# Patient Record
Sex: MALE | Race: WHITE
[De-identification: names, ages, dates, MRNs, and addresses within clinical notes are randomized per-mention and may not be internally consistent; named-entity substitution may affect disease eponyms.]

---

## 2019-06-24 ENCOUNTER — HOSPITAL ENCOUNTER (OUTPATIENT)
Dept: HOSPITAL 95 - ER | Age: 74
Setting detail: OBSERVATION
LOS: 2 days | Discharge: HOME | End: 2019-06-26
Attending: HOSPITALIST | Admitting: HOSPITALIST
Payer: MEDICARE

## 2019-06-24 VITALS — BODY MASS INDEX: 31.84 KG/M2 | HEIGHT: 69.02 IN | WEIGHT: 214.95 LBS

## 2019-06-24 DIAGNOSIS — J44.1: ICD-10-CM

## 2019-06-24 DIAGNOSIS — C79.51: ICD-10-CM

## 2019-06-24 DIAGNOSIS — I10: ICD-10-CM

## 2019-06-24 DIAGNOSIS — Z88.8: ICD-10-CM

## 2019-06-24 DIAGNOSIS — D72.829: ICD-10-CM

## 2019-06-24 DIAGNOSIS — K21.9: ICD-10-CM

## 2019-06-24 DIAGNOSIS — Z87.891: ICD-10-CM

## 2019-06-24 DIAGNOSIS — R55: Primary | ICD-10-CM

## 2019-06-24 DIAGNOSIS — Z79.899: ICD-10-CM

## 2019-06-24 DIAGNOSIS — C61: ICD-10-CM

## 2019-06-24 PROCEDURE — G0378 HOSPITAL OBSERVATION PER HR: HCPCS

## 2019-06-25 LAB
ALBUMIN SERPL BCP-MCNC: 3.1 G/DL (ref 3.4–5)
ALBUMIN/GLOB SERPL: 1 {RATIO} (ref 0.8–1.8)
ALT SERPL W P-5'-P-CCNC: 23 U/L (ref 12–78)
ANION GAP SERPL CALCULATED.4IONS-SCNC: 4 MMOL/L (ref 6–16)
AST SERPL W P-5'-P-CCNC: 15 U/L (ref 12–37)
BILIRUB SERPL-MCNC: 0.4 MG/DL (ref 0.1–1)
BUN SERPL-MCNC: 21 MG/DL (ref 8–24)
CALCIUM SERPL-MCNC: 8.8 MG/DL (ref 8.5–10.1)
CHLORIDE SERPL-SCNC: 106 MMOL/L (ref 98–108)
CO2 SERPL-SCNC: 32 MMOL/L (ref 21–32)
CREAT SERPL-MCNC: 0.88 MG/DL (ref 0.6–1.2)
DEPRECATED RDW RBC AUTO: 48.4 FL (ref 35.1–46.3)
ERYTHROCYTE [DISTWIDTH] IN BLOOD BY AUTOMATED COUNT: 14.6 % (ref 11.7–14.2)
GLOBULIN SER CALC-MCNC: 3 G/DL (ref 2.2–4)
GLUCOSE SERPL-MCNC: 103 MG/DL (ref 70–99)
HCT VFR BLD AUTO: 41.1 % (ref 37–53)
HGB BLD-MCNC: 12.9 G/DL (ref 13.5–17.5)
MCHC RBC AUTO-ENTMCNC: 31.4 G/DL (ref 31.5–36.5)
MCV RBC AUTO: 91 FL (ref 80–100)
NRBC # BLD AUTO: 0 K/MM3 (ref 0–0.02)
NRBC BLD AUTO-RTO: 0 /100 WBC (ref 0–0.2)
PLATELET # BLD AUTO: 288 K/MM3 (ref 150–400)
POTASSIUM SERPL-SCNC: 3.5 MMOL/L (ref 3.5–5.5)
PROT SERPL-MCNC: 6.1 G/DL (ref 6.4–8.2)
SODIUM SERPL-SCNC: 142 MMOL/L (ref 136–145)

## 2019-06-25 NOTE — NUR
SHIFT SUMMARY-
PT DENIES PAIN. DENIES DIZZINESS. DENIES SOB. RESP E/U ON RA. DENIES N/V. ECHO
DONE TODAY. PT WENT FOR CTA OF CHEST THIS PM. NSR AT 70 WITH PVC'S PER PCU
MONITOR TECH. SBA TO THE BATHROOM. PT'S WIFE IN TO VISIT THIS PM. NO OTHER
SIGNIFICANT CHANGES THIS SHIFT.

## 2019-06-25 NOTE — NUR
SHIFT SUMMARY
PT ADMITTED FOR SYNCOPE. FULL CODE. REGULAR DIET. TELE-NSR-AT A RATE OF 84 PER
TELE TECH. PT TO HAVE AN ECHO IN THE MORNING. LOVENOX FOR DVT PROPHYLAXIS. NS
AT A RATE  MLS/HR X1 BAG. 22G IV TO L HAND. SBA WITH TRANSFERS. TAKES
MEDICATIONS WHOLE. THE PT PRESENTED WITH SYNCOPY. THE PT REPORTED HAVING
SEVERAL SYNCOPAL EPISODES OVER THE PAST FEW DAYS WHILE CAUGHING THAT THE PTS
WIFE REPORTS TO HAVE OBSERVED. THE PT PRESENTED FROM [facility] PER
REPORT. THE PT WAS ADMITTED UNDER OBSERVATIONS FOR SYNCOPAL EPISODES OF
UNCLEAR ETIOLOGY, POSSIBLY VASOVAGAL. THE PT TO HAVE ORTHOSTATIC VS COMPLETED.
THE PT IS ALERT, ORIENTED, PLEASENT, AND COOPERATIVE. THE PT IS AWAKE AT THIS
TIME AND CNA ABOUT TO ENTER ROOM. NO APPARENT SIGNS OF ACUTE DISTRESS. ABLE TO
MAKE NEEDS KNOWN AND CALL LIGHT IN REACH.

## 2019-06-26 LAB
ALBUMIN SERPL BCP-MCNC: 3.2 G/DL (ref 3.4–5)
ALBUMIN/GLOB SERPL: 1 {RATIO} (ref 0.8–1.8)
ALT SERPL W P-5'-P-CCNC: 22 U/L (ref 12–78)
ANION GAP SERPL CALCULATED.4IONS-SCNC: 5 MMOL/L (ref 6–16)
AST SERPL W P-5'-P-CCNC: 8 U/L (ref 12–37)
BASOPHILS # BLD AUTO: 0.11 K/MM3 (ref 0–0.23)
BASOPHILS NFR BLD AUTO: 1 % (ref 0–2)
BILIRUB SERPL-MCNC: 0.4 MG/DL (ref 0.1–1)
BUN SERPL-MCNC: 20 MG/DL (ref 8–24)
CALCIUM SERPL-MCNC: 9.2 MG/DL (ref 8.5–10.1)
CHLORIDE SERPL-SCNC: 105 MMOL/L (ref 98–108)
CO2 SERPL-SCNC: 31 MMOL/L (ref 21–32)
CREAT SERPL-MCNC: 1 MG/DL (ref 0.6–1.2)
DEPRECATED RDW RBC AUTO: 48.8 FL (ref 35.1–46.3)
EOSINOPHIL # BLD AUTO: 0.42 K/MM3 (ref 0–0.68)
EOSINOPHIL NFR BLD AUTO: 3 % (ref 0–6)
ERYTHROCYTE [DISTWIDTH] IN BLOOD BY AUTOMATED COUNT: 14.6 % (ref 11.7–14.2)
GLOBULIN SER CALC-MCNC: 3.3 G/DL (ref 2.2–4)
GLUCOSE SERPL-MCNC: 139 MG/DL (ref 70–99)
HCT VFR BLD AUTO: 43.1 % (ref 37–53)
HGB BLD-MCNC: 13.6 G/DL (ref 13.5–17.5)
IMM GRANULOCYTES # BLD AUTO: 0.27 K/MM3 (ref 0–0.1)
IMM GRANULOCYTES NFR BLD AUTO: 2 % (ref 0–1)
LYMPHOCYTES # BLD AUTO: 3.88 K/MM3 (ref 0.84–5.2)
LYMPHOCYTES NFR BLD AUTO: 23 % (ref 21–46)
MCHC RBC AUTO-ENTMCNC: 31.6 G/DL (ref 31.5–36.5)
MCV RBC AUTO: 91 FL (ref 80–100)
MONOCYTES # BLD AUTO: 1.21 K/MM3 (ref 0.16–1.47)
MONOCYTES NFR BLD AUTO: 7 % (ref 4–13)
NEUTROPHILS # BLD AUTO: 10.78 K/MM3 (ref 1.96–9.15)
NEUTROPHILS NFR BLD AUTO: 65 % (ref 41–73)
NRBC # BLD AUTO: 0 K/MM3 (ref 0–0.02)
NRBC BLD AUTO-RTO: 0 /100 WBC (ref 0–0.2)
PLATELET # BLD AUTO: 282 K/MM3 (ref 150–400)
POTASSIUM SERPL-SCNC: 3.9 MMOL/L (ref 3.5–5.5)
PROT SERPL-MCNC: 6.5 G/DL (ref 6.4–8.2)
SODIUM SERPL-SCNC: 141 MMOL/L (ref 136–145)
TSH SERPL DL<=0.005 MIU/L-ACNC: 0.29 UIU/ML (ref 0.36–4.8)

## 2019-06-26 NOTE — NUR
NOC SHIFT SUMMARY
PT ADMITTED FOR SYNCOPE AT REST. TELE IS ON AND WAS NSR RATE 70 WITH PVC'S ON
LAST CHECK. VSS. HE IS AAOX4. RESP EVEN AND UNLABORED. USES CALL LIGHT
APPROPRIATELY. HE HAS SLEPT ON AND OFF THIS NIGHT. NO COMPLAINTS OF PAIN OR
DISCOMFORT. WIFE WAS HERE EARLIER BUT LEFT IN THE EVENING. NO SYNCOPAL
EPISODES SEEN THIS SHIFT. PT PRESENTLY IN BED AND APPEARS IN NO ACUTE
DISTRESS. WILL CONTINUE TO MONITOR.

## 2019-06-26 NOTE — NUR
D/C INSTRUCTIONS PROVIDED AND EXPLAINED TO PT. IV AND TELE REMOVED. PT D/C VIA
WHEELCHAIR WITH SPOUSE AND CNA AT 1600.

## 2019-07-30 ENCOUNTER — HOSPITAL ENCOUNTER (OUTPATIENT)
Dept: HOSPITAL 95 - ORSCSDS | Age: 74
Discharge: HOME | End: 2019-07-30
Attending: INTERNAL MEDICINE
Payer: MEDICARE

## 2019-07-30 VITALS — WEIGHT: 199.08 LBS | BODY MASS INDEX: 29.49 KG/M2 | HEIGHT: 69.02 IN

## 2019-07-30 DIAGNOSIS — E78.5: ICD-10-CM

## 2019-07-30 DIAGNOSIS — Z87.891: ICD-10-CM

## 2019-07-30 DIAGNOSIS — Z79.899: ICD-10-CM

## 2019-07-30 DIAGNOSIS — D12.5: ICD-10-CM

## 2019-07-30 DIAGNOSIS — K22.70: Primary | ICD-10-CM

## 2019-07-30 DIAGNOSIS — D12.2: ICD-10-CM

## 2019-07-30 DIAGNOSIS — I10: ICD-10-CM

## 2019-07-30 DIAGNOSIS — R19.5: ICD-10-CM

## 2019-07-30 DIAGNOSIS — J44.9: ICD-10-CM

## 2019-07-30 DIAGNOSIS — K29.70: ICD-10-CM

## 2019-07-30 PROCEDURE — 0DB58ZX EXCISION OF ESOPHAGUS, VIA NATURAL OR ARTIFICIAL OPENING ENDOSCOPIC, DIAGNOSTIC: ICD-10-PCS | Performed by: INTERNAL MEDICINE

## 2019-07-30 PROCEDURE — 0DB68ZX EXCISION OF STOMACH, VIA NATURAL OR ARTIFICIAL OPENING ENDOSCOPIC, DIAGNOSTIC: ICD-10-PCS | Performed by: INTERNAL MEDICINE

## 2019-07-30 PROCEDURE — 0DBK8ZX EXCISION OF ASCENDING COLON, VIA NATURAL OR ARTIFICIAL OPENING ENDOSCOPIC, DIAGNOSTIC: ICD-10-PCS | Performed by: INTERNAL MEDICINE

## 2019-07-30 PROCEDURE — 0DBN8ZX EXCISION OF SIGMOID COLON, VIA NATURAL OR ARTIFICIAL OPENING ENDOSCOPIC, DIAGNOSTIC: ICD-10-PCS | Performed by: INTERNAL MEDICINE

## 2019-07-30 NOTE — NUR
07/30/19 1525 Reta Skaggs
PT. DENIES ANY PAIN OR SORE THROAT. WHEN TAKING BITE BLOCK OUT OF
MOUTH IN ENDO ROOM, OBSERVED BLEEDING UNDERNEATH TONGUE. PT. DENIES
ANY PAIN OR SORENESS. INSTRUCTED PT. THAT HE MIGHT HAVE A LITTLE CUT
UNDERNEATH HIS TONGUE FROM THE BITE BLOCK IF HE FEELS ANY SORENESS
UNDERNEATH TONGUE.

## 2019-08-02 ENCOUNTER — HOSPITAL ENCOUNTER (OUTPATIENT)
Dept: HOSPITAL 95 - LAB SHORT | Age: 74
Discharge: HOME | End: 2019-08-02
Attending: INTERNAL MEDICINE
Payer: MEDICARE

## 2019-08-02 DIAGNOSIS — D35.02: Primary | ICD-10-CM

## 2019-08-22 ENCOUNTER — HOSPITAL ENCOUNTER (EMERGENCY)
Dept: HOSPITAL 95 - ER | Age: 74
Discharge: HOME | End: 2019-08-22
Payer: MEDICARE

## 2019-08-22 VITALS — WEIGHT: 203 LBS | HEIGHT: 69 IN | BODY MASS INDEX: 30.07 KG/M2

## 2019-08-22 DIAGNOSIS — I10: ICD-10-CM

## 2019-08-22 DIAGNOSIS — J20.9: Primary | ICD-10-CM

## 2019-08-22 DIAGNOSIS — K21.9: ICD-10-CM

## 2019-08-22 DIAGNOSIS — J44.0: ICD-10-CM

## 2019-08-22 DIAGNOSIS — Z79.899: ICD-10-CM

## 2019-08-22 DIAGNOSIS — Z88.8: ICD-10-CM

## 2019-08-22 DIAGNOSIS — Z85.46: ICD-10-CM

## 2019-08-22 LAB
ALBUMIN SERPL BCP-MCNC: 3.8 G/DL (ref 3.4–5)
ALBUMIN/GLOB SERPL: 1.1 {RATIO} (ref 0.8–1.8)
ALT SERPL W P-5'-P-CCNC: 26 U/L (ref 12–78)
ANION GAP SERPL CALCULATED.4IONS-SCNC: 5 MMOL/L (ref 6–16)
AST SERPL W P-5'-P-CCNC: 19 U/L (ref 12–37)
BASOPHILS # BLD AUTO: 0.11 K/MM3 (ref 0–0.23)
BASOPHILS NFR BLD AUTO: 1 % (ref 0–2)
BILIRUB SERPL-MCNC: 0.2 MG/DL (ref 0.1–1)
BUN SERPL-MCNC: 19 MG/DL (ref 8–24)
CALCIUM SERPL-MCNC: 9.1 MG/DL (ref 8.5–10.1)
CHLORIDE SERPL-SCNC: 106 MMOL/L (ref 98–108)
CO2 SERPL-SCNC: 32 MMOL/L (ref 21–32)
CREAT SERPL-MCNC: 1.11 MG/DL (ref 0.6–1.2)
DEPRECATED RDW RBC AUTO: 47.7 FL (ref 35.1–46.3)
EOSINOPHIL # BLD AUTO: 0.71 K/MM3 (ref 0–0.68)
EOSINOPHIL NFR BLD AUTO: 7 % (ref 0–6)
ERYTHROCYTE [DISTWIDTH] IN BLOOD BY AUTOMATED COUNT: 14.3 % (ref 11.7–14.2)
GLOBULIN SER CALC-MCNC: 3.6 G/DL (ref 2.2–4)
GLUCOSE SERPL-MCNC: 128 MG/DL (ref 70–99)
HCT VFR BLD AUTO: 45.9 % (ref 37–53)
HGB BLD-MCNC: 14.5 G/DL (ref 13.5–17.5)
IMM GRANULOCYTES # BLD AUTO: 0.11 K/MM3 (ref 0–0.1)
IMM GRANULOCYTES NFR BLD AUTO: 1 % (ref 0–1)
LYMPHOCYTES # BLD AUTO: 2.53 K/MM3 (ref 0.84–5.2)
LYMPHOCYTES NFR BLD AUTO: 24 % (ref 21–46)
MCHC RBC AUTO-ENTMCNC: 31.6 G/DL (ref 31.5–36.5)
MCV RBC AUTO: 90 FL (ref 80–100)
MONOCYTES # BLD AUTO: 1.21 K/MM3 (ref 0.16–1.47)
MONOCYTES NFR BLD AUTO: 11 % (ref 4–13)
NEUTROPHILS # BLD AUTO: 5.94 K/MM3 (ref 1.96–9.15)
NEUTROPHILS NFR BLD AUTO: 56 % (ref 41–73)
NRBC # BLD AUTO: 0 K/MM3 (ref 0–0.02)
NRBC BLD AUTO-RTO: 0 /100 WBC (ref 0–0.2)
PLATELET # BLD AUTO: 295 K/MM3 (ref 150–400)
POTASSIUM SERPL-SCNC: 3.9 MMOL/L (ref 3.5–5.5)
PROT SERPL-MCNC: 7.4 G/DL (ref 6.4–8.2)
SODIUM SERPL-SCNC: 143 MMOL/L (ref 136–145)
TROPONIN I SERPL-MCNC: 0.02 NG/ML (ref 0–0.04)

## 2020-03-08 ENCOUNTER — HOSPITAL ENCOUNTER (EMERGENCY)
Dept: HOSPITAL 95 - ER | Age: 75
Discharge: HOME | End: 2020-03-08
Payer: MEDICARE

## 2020-03-08 VITALS — BODY MASS INDEX: 30.51 KG/M2 | HEIGHT: 69 IN | WEIGHT: 206 LBS

## 2020-03-08 DIAGNOSIS — Z79.899: ICD-10-CM

## 2020-03-08 DIAGNOSIS — Z79.51: ICD-10-CM

## 2020-03-08 DIAGNOSIS — Z87.891: ICD-10-CM

## 2020-03-08 DIAGNOSIS — J44.9: ICD-10-CM

## 2020-03-08 DIAGNOSIS — C79.51: ICD-10-CM

## 2020-03-08 DIAGNOSIS — Z88.8: ICD-10-CM

## 2020-03-08 DIAGNOSIS — I10: ICD-10-CM

## 2020-03-08 DIAGNOSIS — C61: Primary | ICD-10-CM

## 2020-03-08 DIAGNOSIS — K21.9: ICD-10-CM

## 2020-05-03 ENCOUNTER — HOSPITAL ENCOUNTER (EMERGENCY)
Dept: HOSPITAL 95 - ER | Age: 75
LOS: 1 days | Discharge: HOME | End: 2020-05-04
Payer: MEDICARE

## 2020-05-03 VITALS — WEIGHT: 180.01 LBS | HEIGHT: 68 IN | BODY MASS INDEX: 27.28 KG/M2

## 2020-05-03 DIAGNOSIS — Z87.891: ICD-10-CM

## 2020-05-03 DIAGNOSIS — Z79.51: ICD-10-CM

## 2020-05-03 DIAGNOSIS — Z79.899: ICD-10-CM

## 2020-05-03 DIAGNOSIS — J44.9: ICD-10-CM

## 2020-05-03 DIAGNOSIS — R53.83: Primary | ICD-10-CM

## 2020-05-03 DIAGNOSIS — I10: ICD-10-CM

## 2020-05-03 LAB
ALBUMIN SERPL BCP-MCNC: 3.2 G/DL (ref 3.4–5)
ALBUMIN/GLOB SERPL: 0.8 {RATIO} (ref 0.8–1.8)
ALT SERPL W P-5'-P-CCNC: 25 U/L (ref 12–78)
ANION GAP SERPL CALCULATED.4IONS-SCNC: 5 MMOL/L (ref 6–16)
AST SERPL W P-5'-P-CCNC: 21 U/L (ref 12–37)
BASOPHILS # BLD AUTO: 0.08 K/MM3 (ref 0–0.23)
BASOPHILS NFR BLD AUTO: 0 % (ref 0–2)
BILIRUB SERPL-MCNC: 0.2 MG/DL (ref 0.1–1)
BUN SERPL-MCNC: 41 MG/DL (ref 8–24)
CALCIUM SERPL-MCNC: 9.2 MG/DL (ref 8.5–10.1)
CHLORIDE SERPL-SCNC: 107 MMOL/L (ref 98–108)
CO2 SERPL-SCNC: 30 MMOL/L (ref 21–32)
CREAT SERPL-MCNC: 0.76 MG/DL (ref 0.6–1.2)
DEPRECATED RDW RBC AUTO: 53.4 FL (ref 35.1–46.3)
EOSINOPHIL # BLD AUTO: 0.13 K/MM3 (ref 0–0.68)
EOSINOPHIL NFR BLD AUTO: 1 % (ref 0–6)
ERYTHROCYTE [DISTWIDTH] IN BLOOD BY AUTOMATED COUNT: 16.6 % (ref 11.7–14.2)
GLOBULIN SER CALC-MCNC: 3.8 G/DL (ref 2.2–4)
GLUCOSE SERPL-MCNC: 150 MG/DL (ref 70–99)
HCT VFR BLD AUTO: 45 % (ref 37–53)
HGB BLD-MCNC: 14.1 G/DL (ref 13.5–17.5)
IMM GRANULOCYTES # BLD AUTO: 0.78 K/MM3 (ref 0–0.1)
IMM GRANULOCYTES NFR BLD AUTO: 3 % (ref 0–1)
LYMPHOCYTES # BLD AUTO: 2.91 K/MM3 (ref 0.84–5.2)
LYMPHOCYTES NFR BLD AUTO: 11 % (ref 21–46)
MCHC RBC AUTO-ENTMCNC: 31.3 G/DL (ref 31.5–36.5)
MCV RBC AUTO: 87 FL (ref 80–100)
MONOCYTES # BLD AUTO: 2.42 K/MM3 (ref 0.16–1.47)
MONOCYTES NFR BLD AUTO: 9 % (ref 4–13)
NEUTROPHILS # BLD AUTO: 19.39 K/MM3 (ref 1.96–9.15)
NEUTROPHILS NFR BLD AUTO: 76 % (ref 41–73)
NRBC # BLD AUTO: 0 K/MM3 (ref 0–0.02)
NRBC BLD AUTO-RTO: 0 /100 WBC (ref 0–0.2)
PLATELET # BLD AUTO: 296 K/MM3 (ref 150–400)
POTASSIUM SERPL-SCNC: 3.9 MMOL/L (ref 3.5–5.5)
PROT SERPL-MCNC: 7 G/DL (ref 6.4–8.2)
SODIUM SERPL-SCNC: 142 MMOL/L (ref 136–145)
TROPONIN I SERPL-MCNC: 0.02 NG/ML (ref 0–0.04)

## 2020-05-14 ENCOUNTER — HOSPITAL ENCOUNTER (EMERGENCY)
Dept: HOSPITAL 95 - ER | Age: 75
Discharge: HOME | End: 2020-05-14
Payer: MEDICARE

## 2020-05-14 VITALS — WEIGHT: 206 LBS | HEIGHT: 69 IN | BODY MASS INDEX: 30.51 KG/M2

## 2020-05-14 DIAGNOSIS — Z88.8: ICD-10-CM

## 2020-05-14 DIAGNOSIS — K21.9: ICD-10-CM

## 2020-05-14 DIAGNOSIS — E86.0: Primary | ICD-10-CM

## 2020-05-14 DIAGNOSIS — I10: ICD-10-CM

## 2020-05-14 DIAGNOSIS — Z79.899: ICD-10-CM

## 2020-05-14 DIAGNOSIS — Z85.46: ICD-10-CM

## 2020-05-14 DIAGNOSIS — J44.9: ICD-10-CM

## 2020-05-14 LAB
ALBUMIN SERPL BCP-MCNC: 2.9 G/DL (ref 3.4–5)
ALBUMIN/GLOB SERPL: 0.9 {RATIO} (ref 0.8–1.8)
ALT SERPL W P-5'-P-CCNC: 25 U/L (ref 12–78)
ANION GAP SERPL CALCULATED.4IONS-SCNC: 3 MMOL/L (ref 6–16)
AST SERPL W P-5'-P-CCNC: 13 U/L (ref 12–37)
BASOPHILS # BLD AUTO: 0.08 K/MM3 (ref 0–0.23)
BASOPHILS NFR BLD AUTO: 0 % (ref 0–2)
BILIRUB SERPL-MCNC: 0.6 MG/DL (ref 0.1–1)
BUN SERPL-MCNC: 32 MG/DL (ref 8–24)
CALCIUM SERPL-MCNC: 8.7 MG/DL (ref 8.5–10.1)
CHLORIDE SERPL-SCNC: 106 MMOL/L (ref 98–108)
CO2 SERPL-SCNC: 32 MMOL/L (ref 21–32)
CREAT SERPL-MCNC: 0.85 MG/DL (ref 0.6–1.2)
DEPRECATED RDW RBC AUTO: 55.8 FL (ref 35.1–46.3)
EOSINOPHIL # BLD AUTO: 0.18 K/MM3 (ref 0–0.68)
EOSINOPHIL NFR BLD AUTO: 1 % (ref 0–6)
ERYTHROCYTE [DISTWIDTH] IN BLOOD BY AUTOMATED COUNT: 17.5 % (ref 11.7–14.2)
GLOBULIN SER CALC-MCNC: 3.2 G/DL (ref 2.2–4)
GLUCOSE SERPL-MCNC: 127 MG/DL (ref 70–99)
HCT VFR BLD AUTO: 44.2 % (ref 37–53)
HGB BLD-MCNC: 13.9 G/DL (ref 13.5–17.5)
IMM GRANULOCYTES # BLD AUTO: 0.52 K/MM3 (ref 0–0.1)
IMM GRANULOCYTES NFR BLD AUTO: 2 % (ref 0–1)
LYMPHOCYTES # BLD AUTO: 1.85 K/MM3 (ref 0.84–5.2)
LYMPHOCYTES NFR BLD AUTO: 8 % (ref 21–46)
MAGNESIUM SERPL-MCNC: 2.4 MG/DL (ref 1.6–2.4)
MCHC RBC AUTO-ENTMCNC: 31.4 G/DL (ref 31.5–36.5)
MCV RBC AUTO: 88 FL (ref 80–100)
MONOCYTES # BLD AUTO: 1.69 K/MM3 (ref 0.16–1.47)
MONOCYTES NFR BLD AUTO: 7 % (ref 4–13)
NEUTROPHILS # BLD AUTO: 18.92 K/MM3 (ref 1.96–9.15)
NEUTROPHILS NFR BLD AUTO: 81 % (ref 41–73)
NRBC # BLD AUTO: 0 K/MM3 (ref 0–0.02)
NRBC BLD AUTO-RTO: 0 /100 WBC (ref 0–0.2)
PLATELET # BLD AUTO: 248 K/MM3 (ref 150–400)
POTASSIUM SERPL-SCNC: 4.3 MMOL/L (ref 3.5–5.5)
PROT SERPL-MCNC: 6.1 G/DL (ref 6.4–8.2)
SODIUM SERPL-SCNC: 141 MMOL/L (ref 136–145)
SP GR SPEC: 1.01 (ref 1–1.02)
UROBILINOGEN UR STRIP-MCNC: (no result) MG/DL

## 2020-05-17 ENCOUNTER — HOSPITAL ENCOUNTER (EMERGENCY)
Dept: HOSPITAL 95 - ER | Age: 75
Discharge: HOME | End: 2020-05-17
Payer: MEDICARE

## 2020-05-17 VITALS — BODY MASS INDEX: 29.62 KG/M2 | WEIGHT: 200 LBS | HEIGHT: 69 IN

## 2020-05-17 DIAGNOSIS — K21.9: ICD-10-CM

## 2020-05-17 DIAGNOSIS — J44.9: ICD-10-CM

## 2020-05-17 DIAGNOSIS — Z79.899: ICD-10-CM

## 2020-05-17 DIAGNOSIS — C25.9: ICD-10-CM

## 2020-05-17 DIAGNOSIS — Z79.51: ICD-10-CM

## 2020-05-17 DIAGNOSIS — C79.82: ICD-10-CM

## 2020-05-17 DIAGNOSIS — R11.2: Primary | ICD-10-CM

## 2020-05-17 DIAGNOSIS — Z88.8: ICD-10-CM

## 2020-05-17 DIAGNOSIS — I10: ICD-10-CM

## 2020-05-17 LAB
ALBUMIN SERPL BCP-MCNC: 2.9 G/DL (ref 3.4–5)
ALBUMIN/GLOB SERPL: 0.9 {RATIO} (ref 0.8–1.8)
ALT SERPL W P-5'-P-CCNC: 30 U/L (ref 12–78)
ANION GAP SERPL CALCULATED.4IONS-SCNC: 5 MMOL/L (ref 6–16)
AST SERPL W P-5'-P-CCNC: 16 U/L (ref 12–37)
BASOPHILS # BLD AUTO: 0.02 K/MM3 (ref 0–0.23)
BASOPHILS NFR BLD AUTO: 0 % (ref 0–2)
BILIRUB SERPL-MCNC: 0.7 MG/DL (ref 0.1–1)
BUN SERPL-MCNC: 21 MG/DL (ref 8–24)
CALCIUM SERPL-MCNC: 7.8 MG/DL (ref 8.5–10.1)
CHLORIDE SERPL-SCNC: 103 MMOL/L (ref 98–108)
CO2 SERPL-SCNC: 28 MMOL/L (ref 21–32)
CREAT SERPL-MCNC: 0.8 MG/DL (ref 0.6–1.2)
DEPRECATED RDW RBC AUTO: 56.4 FL (ref 35.1–46.3)
EOSINOPHIL # BLD AUTO: 0.19 K/MM3 (ref 0–0.68)
EOSINOPHIL NFR BLD AUTO: 1 % (ref 0–6)
ERYTHROCYTE [DISTWIDTH] IN BLOOD BY AUTOMATED COUNT: 17.2 % (ref 11.7–14.2)
GLOBULIN SER CALC-MCNC: 3.3 G/DL (ref 2.2–4)
GLUCOSE SERPL-MCNC: 102 MG/DL (ref 70–99)
HCT VFR BLD AUTO: 43 % (ref 37–53)
HGB BLD-MCNC: 13.6 G/DL (ref 13.5–17.5)
IMM GRANULOCYTES # BLD AUTO: 0.26 K/MM3 (ref 0–0.1)
IMM GRANULOCYTES NFR BLD AUTO: 2 % (ref 0–1)
LYMPHOCYTES # BLD AUTO: 1.2 K/MM3 (ref 0.84–5.2)
LYMPHOCYTES NFR BLD AUTO: 9 % (ref 21–46)
MCHC RBC AUTO-ENTMCNC: 31.6 G/DL (ref 31.5–36.5)
MCV RBC AUTO: 90 FL (ref 80–100)
MONOCYTES # BLD AUTO: 1.13 K/MM3 (ref 0.16–1.47)
MONOCYTES NFR BLD AUTO: 8 % (ref 4–13)
NEUTROPHILS # BLD AUTO: 10.88 K/MM3 (ref 1.96–9.15)
NEUTROPHILS NFR BLD AUTO: 80 % (ref 41–73)
NRBC # BLD AUTO: 0 K/MM3 (ref 0–0.02)
NRBC BLD AUTO-RTO: 0 /100 WBC (ref 0–0.2)
PLATELET # BLD AUTO: 166 K/MM3 (ref 150–400)
POTASSIUM SERPL-SCNC: 4.4 MMOL/L (ref 3.5–5.5)
PROT SERPL-MCNC: 6.2 G/DL (ref 6.4–8.2)
SODIUM SERPL-SCNC: 136 MMOL/L (ref 136–145)

## 2020-05-27 ENCOUNTER — HOSPITAL ENCOUNTER (OUTPATIENT)
Dept: HOSPITAL 95 - ORSCSDS | Age: 75
Discharge: HOME | End: 2020-05-27
Attending: INTERNAL MEDICINE
Payer: MEDICARE

## 2020-05-27 VITALS — WEIGHT: 200.84 LBS | BODY MASS INDEX: 29.75 KG/M2 | HEIGHT: 69.02 IN

## 2020-05-27 DIAGNOSIS — R19.7: ICD-10-CM

## 2020-05-27 DIAGNOSIS — Z79.899: ICD-10-CM

## 2020-05-27 DIAGNOSIS — I10: ICD-10-CM

## 2020-05-27 DIAGNOSIS — R10.9: ICD-10-CM

## 2020-05-27 DIAGNOSIS — R93.3: ICD-10-CM

## 2020-05-27 DIAGNOSIS — K21.0: ICD-10-CM

## 2020-05-27 DIAGNOSIS — B37.81: ICD-10-CM

## 2020-05-27 DIAGNOSIS — R11.2: ICD-10-CM

## 2020-05-27 DIAGNOSIS — K29.80: ICD-10-CM

## 2020-05-27 DIAGNOSIS — K22.70: Primary | ICD-10-CM

## 2020-05-27 DIAGNOSIS — K29.70: ICD-10-CM

## 2020-05-27 DIAGNOSIS — Z87.11: ICD-10-CM

## 2020-05-27 PROCEDURE — 0DB68ZX EXCISION OF STOMACH, VIA NATURAL OR ARTIFICIAL OPENING ENDOSCOPIC, DIAGNOSTIC: ICD-10-PCS | Performed by: INTERNAL MEDICINE

## 2020-05-27 PROCEDURE — 0DB58ZX EXCISION OF ESOPHAGUS, VIA NATURAL OR ARTIFICIAL OPENING ENDOSCOPIC, DIAGNOSTIC: ICD-10-PCS | Performed by: INTERNAL MEDICINE

## 2020-05-27 NOTE — NUR
05/27/20 1455 Reta Skaggs
PT. WITH DECREASED LUNG SOUNDS WITH FAINT EXP. WHEEZES. DR. JO
NOTIFED SATS 90-94 & FEELS SOB WITH WEARING MASK. ORDERS GIVEN FOR
DUONEB, DUONEB GIVEN. SATS UP TO 98% WHILE INHALATION OF DUONEB.

## 2020-06-08 ENCOUNTER — HOSPITAL ENCOUNTER (EMERGENCY)
Dept: HOSPITAL 95 - ER | Age: 75
Discharge: HOME | End: 2020-06-08
Payer: MEDICARE

## 2020-06-08 VITALS — HEIGHT: 69 IN | BODY MASS INDEX: 29.92 KG/M2 | WEIGHT: 202.01 LBS

## 2020-06-08 DIAGNOSIS — Z88.8: ICD-10-CM

## 2020-06-08 DIAGNOSIS — J44.9: ICD-10-CM

## 2020-06-08 DIAGNOSIS — I10: ICD-10-CM

## 2020-06-08 DIAGNOSIS — R60.0: ICD-10-CM

## 2020-06-08 DIAGNOSIS — Z79.899: ICD-10-CM

## 2020-06-08 DIAGNOSIS — R33.9: Primary | ICD-10-CM

## 2020-06-08 DIAGNOSIS — Z87.891: ICD-10-CM

## 2020-06-08 DIAGNOSIS — Z85.46: ICD-10-CM

## 2020-06-08 LAB
ALBUMIN SERPL BCP-MCNC: 2.8 G/DL (ref 3.4–5)
ALBUMIN/GLOB SERPL: 0.8 {RATIO} (ref 0.8–1.8)
ALT SERPL W P-5'-P-CCNC: 24 U/L (ref 12–78)
ANION GAP SERPL CALCULATED.4IONS-SCNC: 1 MMOL/L (ref 6–16)
AST SERPL W P-5'-P-CCNC: 26 U/L (ref 12–37)
BASOPHILS # BLD AUTO: 0.1 K/MM3 (ref 0–0.23)
BASOPHILS # BLD: 0.15 K/MM3 (ref 0–0.23)
BASOPHILS NFR BLD AUTO: 1 % (ref 0–2)
BASOPHILS NFR BLD: 2 % (ref 0–2)
BILIRUB SERPL-MCNC: 0.2 MG/DL (ref 0.1–1)
BUN SERPL-MCNC: 27 MG/DL (ref 8–24)
CALCIUM SERPL-MCNC: 9.4 MG/DL (ref 8.5–10.1)
CHLORIDE SERPL-SCNC: 111 MMOL/L (ref 98–108)
CO2 SERPL-SCNC: 33 MMOL/L (ref 21–32)
CREAT SERPL-MCNC: 1.03 MG/DL (ref 0.6–1.2)
DEPRECATED RDW RBC AUTO: 56.3 FL (ref 35.1–46.3)
EOSINOPHIL # BLD AUTO: 0.2 K/MM3 (ref 0–0.68)
EOSINOPHIL # BLD: 0.15 K/MM3 (ref 0–0.68)
EOSINOPHIL NFR BLD AUTO: 3 % (ref 0–6)
EOSINOPHIL NFR BLD: 2 % (ref 0–6)
ERYTHROCYTE [DISTWIDTH] IN BLOOD BY AUTOMATED COUNT: 17.2 % (ref 11.7–14.2)
GLOBULIN SER CALC-MCNC: 3.7 G/DL (ref 2.2–4)
GLUCOSE SERPL-MCNC: 127 MG/DL (ref 70–99)
HCT VFR BLD AUTO: 41.9 % (ref 37–53)
HGB BLD-MCNC: 12.7 G/DL (ref 13.5–17.5)
IMM GRANULOCYTES # BLD AUTO: 0.45 K/MM3 (ref 0–0.1)
IMM GRANULOCYTES NFR BLD AUTO: 6 % (ref 0–1)
LYMPHOCYTES # BLD AUTO: 1.59 K/MM3 (ref 0.84–5.2)
LYMPHOCYTES # BLD: 1.8 K/MM3 (ref 0.84–5.2)
LYMPHOCYTES NFR BLD AUTO: 21 % (ref 21–46)
LYMPHOCYTES NFR BLD: 24 % (ref 21–46)
MCHC RBC AUTO-ENTMCNC: 30.3 G/DL (ref 31.5–36.5)
MCV RBC AUTO: 90 FL (ref 80–100)
METAMYELOCYTES # BLD MANUAL: 0.37 K/MM3 (ref 0–0)
METAMYELOCYTES NFR BLD MANUAL: 5 % (ref 0–0)
MONOCYTES # BLD AUTO: 1.22 K/MM3 (ref 0.16–1.47)
MONOCYTES # BLD: 1.2 K/MM3 (ref 0.16–1.47)
MONOCYTES NFR BLD AUTO: 16 % (ref 4–13)
MONOCYTES NFR BLD: 16 % (ref 4–13)
MYELOCYTES # BLD MANUAL: 0.07 K/MM3 (ref 0–0)
MYELOCYTES NFR BLD MANUAL: 1 % (ref 0–0)
NEUTROPHILS # BLD AUTO: 3.98 K/MM3 (ref 1.96–9.15)
NEUTROPHILS NFR BLD AUTO: 53 % (ref 41–73)
NEUTS SEG # BLD MANUAL: 3.77 K/MM3 (ref 1.96–9.15)
NEUTS SEG NFR BLD MANUAL: 50 % (ref 41–73)
NRBC # BLD AUTO: 0 K/MM3 (ref 0–0.02)
NRBC BLD AUTO-RTO: 0 /100 WBC (ref 0–0.2)
PLATELET # BLD AUTO: 259 K/MM3 (ref 150–400)
POTASSIUM SERPL-SCNC: 4.3 MMOL/L (ref 3.5–5.5)
PROT SERPL-MCNC: 6.5 G/DL (ref 6.4–8.2)
RBC #/AREA URNS HPF: (no result) /HPF (ref 0–2)
SODIUM SERPL-SCNC: 145 MMOL/L (ref 136–145)
SP GR SPEC: 1.01 (ref 1–1.02)
TOTAL CELLS COUNTED BLD: 100
UROBILINOGEN UR STRIP-MCNC: (no result) MG/DL
WBC #/AREA URNS HPF: (no result) /HPF (ref 0–5)

## 2020-10-08 ENCOUNTER — HOSPITAL ENCOUNTER (EMERGENCY)
Dept: HOSPITAL 95 - ER | Age: 75
Discharge: HOME | End: 2020-10-08
Payer: MEDICARE

## 2020-10-08 VITALS — HEIGHT: 69 IN | BODY MASS INDEX: 31.1 KG/M2 | WEIGHT: 209.99 LBS

## 2020-10-08 DIAGNOSIS — J44.9: ICD-10-CM

## 2020-10-08 DIAGNOSIS — I10: ICD-10-CM

## 2020-10-08 DIAGNOSIS — Z79.899: ICD-10-CM

## 2020-10-08 DIAGNOSIS — Z87.891: ICD-10-CM

## 2020-10-08 DIAGNOSIS — Z88.8: ICD-10-CM

## 2020-10-08 DIAGNOSIS — I87.2: Primary | ICD-10-CM

## 2020-10-08 LAB
ALBUMIN SERPL BCP-MCNC: 3.6 G/DL (ref 3.4–5)
ALBUMIN/GLOB SERPL: 1.1 {RATIO} (ref 0.8–1.8)
ALT SERPL W P-5'-P-CCNC: 28 U/L (ref 12–78)
ANION GAP SERPL CALCULATED.4IONS-SCNC: 6 MMOL/L (ref 6–16)
AST SERPL W P-5'-P-CCNC: 20 U/L (ref 12–37)
BASOPHILS # BLD AUTO: 0.1 K/MM3 (ref 0–0.23)
BASOPHILS NFR BLD AUTO: 1 % (ref 0–2)
BILIRUB SERPL-MCNC: 0.2 MG/DL (ref 0.1–1)
BUN SERPL-MCNC: 21 MG/DL (ref 8–24)
CALCIUM SERPL-MCNC: 8.9 MG/DL (ref 8.5–10.1)
CHLORIDE SERPL-SCNC: 108 MMOL/L (ref 98–108)
CO2 SERPL-SCNC: 28 MMOL/L (ref 21–32)
CREAT SERPL-MCNC: 0.83 MG/DL (ref 0.6–1.2)
DEPRECATED RDW RBC AUTO: 47.6 FL (ref 35.1–46.3)
EOSINOPHIL # BLD AUTO: 0.46 K/MM3 (ref 0–0.68)
EOSINOPHIL NFR BLD AUTO: 4 % (ref 0–6)
ERYTHROCYTE [DISTWIDTH] IN BLOOD BY AUTOMATED COUNT: 14.4 % (ref 11.7–14.2)
GLOBULIN SER CALC-MCNC: 3.3 G/DL (ref 2.2–4)
GLUCOSE SERPL-MCNC: 159 MG/DL (ref 70–99)
HCT VFR BLD AUTO: 43.6 % (ref 37–53)
HGB BLD-MCNC: 13.5 G/DL (ref 13.5–17.5)
IMM GRANULOCYTES # BLD AUTO: 0.08 K/MM3 (ref 0–0.1)
IMM GRANULOCYTES NFR BLD AUTO: 1 % (ref 0–1)
LYMPHOCYTES # BLD AUTO: 1.66 K/MM3 (ref 0.84–5.2)
LYMPHOCYTES NFR BLD AUTO: 15 % (ref 21–46)
MCHC RBC AUTO-ENTMCNC: 31 G/DL (ref 31.5–36.5)
MCV RBC AUTO: 90 FL (ref 80–100)
MONOCYTES # BLD AUTO: 1.15 K/MM3 (ref 0.16–1.47)
MONOCYTES NFR BLD AUTO: 10 % (ref 4–13)
NEUTROPHILS # BLD AUTO: 7.74 K/MM3 (ref 1.96–9.15)
NEUTROPHILS NFR BLD AUTO: 69 % (ref 41–73)
NRBC # BLD AUTO: 0 K/MM3 (ref 0–0.02)
NRBC BLD AUTO-RTO: 0 /100 WBC (ref 0–0.2)
PLATELET # BLD AUTO: 219 K/MM3 (ref 150–400)
POTASSIUM SERPL-SCNC: 4.1 MMOL/L (ref 3.5–5.5)
PROT SERPL-MCNC: 6.9 G/DL (ref 6.4–8.2)
SODIUM SERPL-SCNC: 142 MMOL/L (ref 136–145)

## 2020-12-13 ENCOUNTER — HOSPITAL ENCOUNTER (INPATIENT)
Dept: HOSPITAL 95 - ER | Age: 75
LOS: 3 days | Discharge: HOME | DRG: 291 | End: 2020-12-16
Attending: INTERNAL MEDICINE | Admitting: INTERNAL MEDICINE
Payer: MEDICARE

## 2020-12-13 VITALS — BODY MASS INDEX: 31.54 KG/M2 | WEIGHT: 212.97 LBS | HEIGHT: 69 IN

## 2020-12-13 DIAGNOSIS — R82.71: ICD-10-CM

## 2020-12-13 DIAGNOSIS — C79.51: ICD-10-CM

## 2020-12-13 DIAGNOSIS — J44.9: ICD-10-CM

## 2020-12-13 DIAGNOSIS — Z87.891: ICD-10-CM

## 2020-12-13 DIAGNOSIS — N40.1: ICD-10-CM

## 2020-12-13 DIAGNOSIS — I11.0: Primary | ICD-10-CM

## 2020-12-13 DIAGNOSIS — I35.0: ICD-10-CM

## 2020-12-13 DIAGNOSIS — J96.01: ICD-10-CM

## 2020-12-13 DIAGNOSIS — Z20.828: ICD-10-CM

## 2020-12-13 DIAGNOSIS — C61: ICD-10-CM

## 2020-12-13 DIAGNOSIS — I50.33: ICD-10-CM

## 2020-12-13 LAB
ALBUMIN SERPL BCP-MCNC: 3.3 G/DL (ref 3.4–5)
ALBUMIN/GLOB SERPL: 1.1 {RATIO} (ref 0.8–1.8)
ALT SERPL W P-5'-P-CCNC: 20 U/L (ref 12–78)
ANION GAP SERPL CALCULATED.4IONS-SCNC: 5 MMOL/L (ref 6–16)
AST SERPL W P-5'-P-CCNC: 32 U/L (ref 12–37)
BASOPHILS # BLD AUTO: 0.09 K/MM3 (ref 0–0.23)
BASOPHILS NFR BLD AUTO: 1 % (ref 0–2)
BILIRUB SERPL-MCNC: 0.3 MG/DL (ref 0.1–1)
BUN SERPL-MCNC: 22 MG/DL (ref 8–24)
CALCIUM SERPL-MCNC: 8.8 MG/DL (ref 8.5–10.1)
CHLORIDE SERPL-SCNC: 109 MMOL/L (ref 98–108)
CO2 SERPL-SCNC: 30 MMOL/L (ref 21–32)
CREAT SERPL-MCNC: 0.83 MG/DL (ref 0.6–1.2)
DEPRECATED RDW RBC AUTO: 48.6 FL (ref 35.1–46.3)
EOSINOPHIL # BLD AUTO: 0.3 K/MM3 (ref 0–0.68)
EOSINOPHIL NFR BLD AUTO: 4 % (ref 0–6)
ERYTHROCYTE [DISTWIDTH] IN BLOOD BY AUTOMATED COUNT: 15.1 % (ref 11.7–14.2)
GLOBULIN SER CALC-MCNC: 2.9 G/DL (ref 2.2–4)
GLUCOSE SERPL-MCNC: 126 MG/DL (ref 70–99)
HCT VFR BLD AUTO: 37.6 % (ref 37–53)
HGB BLD-MCNC: 11.7 G/DL (ref 13.5–17.5)
IMM GRANULOCYTES # BLD AUTO: 0.06 K/MM3 (ref 0–0.1)
IMM GRANULOCYTES NFR BLD AUTO: 1 % (ref 0–1)
LEUKOCYTE ESTERASE UR QL STRIP: (no result)
LYMPHOCYTES # BLD AUTO: 1.4 K/MM3 (ref 0.84–5.2)
LYMPHOCYTES NFR BLD AUTO: 16 % (ref 21–46)
MCHC RBC AUTO-ENTMCNC: 31.1 G/DL (ref 31.5–36.5)
MCV RBC AUTO: 90 FL (ref 80–100)
MONOCYTES # BLD AUTO: 1 K/MM3 (ref 0.16–1.47)
MONOCYTES NFR BLD AUTO: 12 % (ref 4–13)
NEUTROPHILS # BLD AUTO: 5.79 K/MM3 (ref 1.96–9.15)
NEUTROPHILS NFR BLD AUTO: 67 % (ref 41–73)
NRBC # BLD AUTO: 0 K/MM3 (ref 0–0.02)
NRBC BLD AUTO-RTO: 0 /100 WBC (ref 0–0.2)
PLATELET # BLD AUTO: 152 K/MM3 (ref 150–400)
POTASSIUM SERPL-SCNC: 3.9 MMOL/L (ref 3.5–5.5)
PROT SERPL-MCNC: 6.2 G/DL (ref 6.4–8.2)
PROT UR STRIP-MCNC: (no result) MG/DL
RBC #/AREA URNS HPF: (no result) /HPF (ref 0–2)
SODIUM SERPL-SCNC: 144 MMOL/L (ref 136–145)
SP GR SPEC: 1.01 (ref 1–1.02)
TROPONIN I SERPL-MCNC: 0.06 NG/ML (ref 0–0.04)
UROBILINOGEN UR STRIP-MCNC: (no result) MG/DL
WBC #/AREA URNS HPF: (no result) /HPF (ref 0–5)

## 2020-12-13 PROCEDURE — A9270 NON-COVERED ITEM OR SERVICE: HCPCS

## 2020-12-13 NOTE — NUR
PATIENT ARRIVED TO ROOM 304 AT 2015 VIA STRETCHER. HE IS ALERT AND ORIENTED.
ABLE TO SELF TRANSFER BETWEEN STRETCHER AND BED. IS FAIRLY STEADY ON HIS FEET.
HAS NO COMPLAINTS OF PAIN OR SHORTNESS OF BREATH. WIFE ACCOMPANIED PATIENT TO
HIS ROOM AND HAS JUST LEFT. PATIENT IS NOW EATING A SNACK.

## 2020-12-14 LAB
ANION GAP SERPL CALCULATED.4IONS-SCNC: 4 MMOL/L (ref 6–16)
BASOPHILS # BLD AUTO: 0.08 K/MM3 (ref 0–0.23)
BASOPHILS NFR BLD AUTO: 1 % (ref 0–2)
BUN SERPL-MCNC: 17 MG/DL (ref 8–24)
CALCIUM SERPL-MCNC: 8.5 MG/DL (ref 8.5–10.1)
CHLORIDE SERPL-SCNC: 108 MMOL/L (ref 98–108)
CO2 SERPL-SCNC: 30 MMOL/L (ref 21–32)
CREAT SERPL-MCNC: 0.81 MG/DL (ref 0.6–1.2)
DEPRECATED RDW RBC AUTO: 48.1 FL (ref 35.1–46.3)
EOSINOPHIL # BLD AUTO: 0.35 K/MM3 (ref 0–0.68)
EOSINOPHIL NFR BLD AUTO: 4 % (ref 0–6)
ERYTHROCYTE [DISTWIDTH] IN BLOOD BY AUTOMATED COUNT: 15 % (ref 11.7–14.2)
GLUCOSE SERPL-MCNC: 99 MG/DL (ref 70–99)
HCT VFR BLD AUTO: 37.5 % (ref 37–53)
HGB BLD-MCNC: 11.5 G/DL (ref 13.5–17.5)
IMM GRANULOCYTES # BLD AUTO: 0.07 K/MM3 (ref 0–0.1)
IMM GRANULOCYTES NFR BLD AUTO: 1 % (ref 0–1)
LYMPHOCYTES # BLD AUTO: 1.6 K/MM3 (ref 0.84–5.2)
LYMPHOCYTES NFR BLD AUTO: 16 % (ref 21–46)
MAGNESIUM SERPL-MCNC: 2 MG/DL (ref 1.6–2.4)
MCHC RBC AUTO-ENTMCNC: 30.7 G/DL (ref 31.5–36.5)
MCV RBC AUTO: 89 FL (ref 80–100)
MONOCYTES # BLD AUTO: 1.12 K/MM3 (ref 0.16–1.47)
MONOCYTES NFR BLD AUTO: 11 % (ref 4–13)
NEUTROPHILS # BLD AUTO: 6.85 K/MM3 (ref 1.96–9.15)
NEUTROPHILS NFR BLD AUTO: 68 % (ref 41–73)
NRBC # BLD AUTO: 0 K/MM3 (ref 0–0.02)
NRBC BLD AUTO-RTO: 0 /100 WBC (ref 0–0.2)
PLATELET # BLD AUTO: 162 K/MM3 (ref 150–400)
POTASSIUM SERPL-SCNC: 3.7 MMOL/L (ref 3.5–5.5)
SODIUM SERPL-SCNC: 142 MMOL/L (ref 136–145)

## 2020-12-14 NOTE — NUR
PHYSICIAN COMMUNICATION
CONTACTED ON CALL PHYSICIAN, DR DODSON, TO NOTIFY HIM THAT THE PATIENT
NORMALLY TAKES ONE 5 MG TAB OF OXYCODONE AT BEDTIME AND IBUPROFEN DURING THE
DAY TO MANAGE HIS PAIN. DR DODSON SAID TO ORDER THE HOME DOSE OF OXYCODONE
AND ORDERED 200 MG OF IBUPROFEN BID PRN.

## 2020-12-14 NOTE — NUR
SHIFT SUMMARY
PATIENT ALERT AND ORIENTED. HAD NO COMPLAINTS OF SHORTNESS OF BREATH
OVERNIGHT. MEDICATED PER EMAR FOR PAIN. UA OBTAINED AND SENT TO LAB. IV PATENT
AND FLUSHED. BED IN LOWEST POSITION WITH WHEELS LOCKED AND ALARM ON. CALL
LIGHT WITHIN REACH. REPORT GIVEN TO ONCOMING JEISON.

## 2020-12-14 NOTE — NUR
[facility] spiritual care note:
 
Mr. Warner states he's feeling better and has no concerns.  He is hopeful for
recovery and feels well supported.  Prayer for continued healing provided.  I
will remain available.

## 2020-12-15 LAB
ANION GAP SERPL CALCULATED.4IONS-SCNC: 4 MMOL/L (ref 6–16)
BUN SERPL-MCNC: 17 MG/DL (ref 8–24)
CALCIUM SERPL-MCNC: 8.8 MG/DL (ref 8.5–10.1)
CHLORIDE SERPL-SCNC: 103 MMOL/L (ref 98–108)
CO2 SERPL-SCNC: 33 MMOL/L (ref 21–32)
CREAT SERPL-MCNC: 0.93 MG/DL (ref 0.6–1.2)
GLUCOSE SERPL-MCNC: 103 MG/DL (ref 70–99)
MAGNESIUM SERPL-MCNC: 2.3 MG/DL (ref 1.6–2.4)
PHOSPHATE SERPL-MCNC: 2.1 MG/DL (ref 2.5–4.9)
POTASSIUM SERPL-SCNC: 3.9 MMOL/L (ref 3.5–5.5)
SODIUM SERPL-SCNC: 140 MMOL/L (ref 136–145)

## 2020-12-15 NOTE — NUR
SHIFT SUMMARY
PT AOX4; CALLS APPROPRIATELY. PT ON CPAP AT NIGHT 2L BLEEDIN. PT C/O LOW BACK
PAIN ON R HIP DUE TO CANCER; MEDICATED PER EMAR. MINDA IS DRAINING AND PATENT.
PT ON TELE NSR @60S WITH PVC.  DENIES CP AND N&V. BED IS IN THE LOWEST
POSITION AND CALL LIGHTS WITHIN REACH.

## 2020-12-15 NOTE — NUR
SPOKE WITH DR. MIKE ON THE PHONE ABOUT PT NOT TOLERATING THE POTASSIUM
PHOSPHATE IV.   DC IV MEDICATION AND SAID SHE WOULD PUT IN NEW ORAL
MEDICATION ORDER.  LET DR KNOW THAT ON ROOM AIR PT SATURATIONS WAS 86 ON 1L O2
PT SATURATIONS REMAINED IN 90%-91%.  DR SAID TO UP O2 LEVEL ALESIA 1.5L TO 2L IF
NEEDED.  WILL TRY TO WEEN OFF O2 LEVEL LATER IN THE DAY.

## 2020-12-15 NOTE — NUR
SHIFT SUMMARY
PT IS A&OX4.  PT IS INDEPENDENT IN ROOM.  PT'S WIFE CAME TO VISIT AND WOULD
LIKE TO HAVE PERSCRIPTION OF LASIX AND POTASSIUM SENT TO PHARMACY BEFORE
DISCHARGE.  PT IS ON 1L OF O2 AND TOLERATES IT WELL.  PT DESATS ON RM TO
RANGING FROM 85-87%.  PT COMPLAINED OF CHRONIC
BACK PAIN AND MEDICATED PER EMAR.  PT DENIES N/V.  PT WAS GIVEN IV POTASSIUM
AND DID NOT TOLERATE IT WELL, MEDICATION SWITCHED TO ORAL.  PT WAS UNABLE TO
SWALLOW LARGE POTASSIUM PILLS, CHECKED WITH PHARMACY AND PILLS WERE GIVEN
CRUSHED IN APPLESAUCE.  PT SITTING ON BED IN ROOM WITH CALL LIGHT WITH IN
REACH.

## 2020-12-16 LAB
ANION GAP SERPL CALCULATED.4IONS-SCNC: 4 MMOL/L (ref 6–16)
BASOPHILS # BLD AUTO: 0.12 K/MM3 (ref 0–0.23)
BASOPHILS NFR BLD AUTO: 1 % (ref 0–2)
BUN SERPL-MCNC: 25 MG/DL (ref 8–24)
CALCIUM SERPL-MCNC: 8.7 MG/DL (ref 8.5–10.1)
CHLORIDE SERPL-SCNC: 102 MMOL/L (ref 98–108)
CO2 SERPL-SCNC: 32 MMOL/L (ref 21–32)
CREAT SERPL-MCNC: 0.91 MG/DL (ref 0.6–1.2)
DEPRECATED RDW RBC AUTO: 48.7 FL (ref 35.1–46.3)
EOSINOPHIL # BLD AUTO: 0.44 K/MM3 (ref 0–0.68)
EOSINOPHIL NFR BLD AUTO: 4 % (ref 0–6)
ERYTHROCYTE [DISTWIDTH] IN BLOOD BY AUTOMATED COUNT: 15.1 % (ref 11.7–14.2)
GLUCOSE SERPL-MCNC: 131 MG/DL (ref 70–99)
HCT VFR BLD AUTO: 43.1 % (ref 37–53)
HGB BLD-MCNC: 13.3 G/DL (ref 13.5–17.5)
IMM GRANULOCYTES # BLD AUTO: 0.09 K/MM3 (ref 0–0.1)
IMM GRANULOCYTES NFR BLD AUTO: 1 % (ref 0–1)
LYMPHOCYTES # BLD AUTO: 2.4 K/MM3 (ref 0.84–5.2)
LYMPHOCYTES NFR BLD AUTO: 19 % (ref 21–46)
MCHC RBC AUTO-ENTMCNC: 30.9 G/DL (ref 31.5–36.5)
MCV RBC AUTO: 88 FL (ref 80–100)
MONOCYTES # BLD AUTO: 1.55 K/MM3 (ref 0.16–1.47)
MONOCYTES NFR BLD AUTO: 12 % (ref 4–13)
NEUTROPHILS # BLD AUTO: 8.14 K/MM3 (ref 1.96–9.15)
NEUTROPHILS NFR BLD AUTO: 64 % (ref 41–73)
NRBC # BLD AUTO: 0 K/MM3 (ref 0–0.02)
NRBC BLD AUTO-RTO: 0 /100 WBC (ref 0–0.2)
PLATELET # BLD AUTO: 184 K/MM3 (ref 150–400)
POTASSIUM SERPL-SCNC: 3.8 MMOL/L (ref 3.5–5.5)
SODIUM SERPL-SCNC: 138 MMOL/L (ref 136–145)

## 2020-12-16 NOTE — NUR
NIGHT SHIFT SUMMARY
Champ slept intermittantly overnight using his CPAP , but woke frequently to
change positions, due to chronic back and right hip pain.  Patient is alert
and oriented and pleasantly cooperative with care

## 2020-12-16 NOTE — NUR
DISCHARGE NOTE
PT RECEIVED DISCHARGE PACKET AND REVIEWED IT WITH THIS RN ALONG WITH SPOUSE IN
THE ROOM.  PT MADE AWARE OF MEDICATIONS CHANGES AND MEDICATIONS LIST FAX TO
 [facility] IN [place].  DC PT MINDA AND ELENI.  PT WILL RECEIVE HOME OXYGEN
AND SENT HOME WITH OXYGEN.  PT WIFE TOOK PT HOME IN CAR AND ESCORTED OFF
THE FLOOR BY THIS RN.  PT DISCHARGED AT 2432.

## 2021-05-08 ENCOUNTER — HOSPITAL ENCOUNTER (EMERGENCY)
Dept: HOSPITAL 95 - ER | Age: 76
LOS: 1 days | Discharge: HOME | End: 2021-05-09
Payer: MEDICARE

## 2021-05-08 VITALS — HEIGHT: 69 IN | BODY MASS INDEX: 29.03 KG/M2 | WEIGHT: 195.99 LBS

## 2021-05-08 DIAGNOSIS — I11.0: Primary | ICD-10-CM

## 2021-05-08 DIAGNOSIS — Z87.891: ICD-10-CM

## 2021-05-08 DIAGNOSIS — J44.9: ICD-10-CM

## 2021-05-08 DIAGNOSIS — Z20.822: ICD-10-CM

## 2021-05-08 DIAGNOSIS — I50.9: ICD-10-CM

## 2021-05-08 LAB
ALBUMIN SERPL BCP-MCNC: 3.1 G/DL (ref 3.4–5)
ALBUMIN/GLOB SERPL: 0.9 {RATIO} (ref 0.8–1.8)
ALT SERPL W P-5'-P-CCNC: 26 U/L (ref 12–78)
ANION GAP SERPL CALCULATED.4IONS-SCNC: 5 MMOL/L (ref 6–16)
AST SERPL W P-5'-P-CCNC: 27 U/L (ref 12–37)
BASOPHILS # BLD AUTO: 0.11 K/MM3 (ref 0–0.23)
BASOPHILS NFR BLD AUTO: 1 % (ref 0–2)
BILIRUB SERPL-MCNC: 0.2 MG/DL (ref 0.1–1)
BUN SERPL-MCNC: 26 MG/DL (ref 8–24)
CALCIUM SERPL-MCNC: 9 MG/DL (ref 8.5–10.1)
CHLORIDE SERPL-SCNC: 110 MMOL/L (ref 98–108)
CO2 SERPL-SCNC: 26 MMOL/L (ref 21–32)
CREAT SERPL-MCNC: 1.05 MG/DL (ref 0.6–1.2)
DEPRECATED RDW RBC AUTO: 55.4 FL (ref 35.1–46.3)
EOSINOPHIL # BLD AUTO: 0.23 K/MM3 (ref 0–0.68)
EOSINOPHIL NFR BLD AUTO: 2 % (ref 0–6)
ERYTHROCYTE [DISTWIDTH] IN BLOOD BY AUTOMATED COUNT: 17.2 % (ref 11.7–14.2)
GLOBULIN SER CALC-MCNC: 3.6 G/DL (ref 2.2–4)
GLUCOSE SERPL-MCNC: 109 MG/DL (ref 70–99)
HCT VFR BLD AUTO: 31.4 % (ref 37–53)
HGB BLD-MCNC: 9.8 G/DL (ref 13.5–17.5)
IMM GRANULOCYTES # BLD AUTO: 0.45 K/MM3 (ref 0–0.1)
IMM GRANULOCYTES NFR BLD AUTO: 4 % (ref 0–1)
LYMPHOCYTES # BLD AUTO: 1.26 K/MM3 (ref 0.84–5.2)
LYMPHOCYTES NFR BLD AUTO: 12 % (ref 21–46)
MCHC RBC AUTO-ENTMCNC: 31.2 G/DL (ref 31.5–36.5)
MCV RBC AUTO: 89 FL (ref 80–100)
MONOCYTES # BLD AUTO: 0.91 K/MM3 (ref 0.16–1.47)
MONOCYTES NFR BLD AUTO: 9 % (ref 4–13)
NEUTROPHILS # BLD AUTO: 7.61 K/MM3 (ref 1.96–9.15)
NEUTROPHILS NFR BLD AUTO: 72 % (ref 41–73)
NRBC # BLD AUTO: 0 K/MM3 (ref 0–0.02)
NRBC BLD AUTO-RTO: 0 /100 WBC (ref 0–0.2)
PLATELET # BLD AUTO: 233 K/MM3 (ref 150–400)
POTASSIUM SERPL-SCNC: 4.7 MMOL/L (ref 3.5–5.5)
PROT SERPL-MCNC: 6.7 G/DL (ref 6.4–8.2)
SODIUM SERPL-SCNC: 141 MMOL/L (ref 136–145)
TROPONIN I SERPL-MCNC: 0.03 NG/ML (ref 0–0.04)

## 2021-05-08 PROCEDURE — U0004 COV-19 TEST NON-CDC HGH THRU: HCPCS

## 2021-05-08 PROCEDURE — A9270 NON-COVERED ITEM OR SERVICE: HCPCS

## 2021-05-09 LAB — SARS-COV-2 RNA RESP QL NAA+PROBE: NEGATIVE

## 2021-08-18 ENCOUNTER — HOSPITAL ENCOUNTER (OUTPATIENT)
Dept: HOSPITAL 95 - ER | Age: 76
Setting detail: OBSERVATION
LOS: 1 days | Discharge: HOME | End: 2021-08-19
Attending: HOSPITALIST | Admitting: HOSPITALIST
Payer: MEDICARE

## 2021-08-18 VITALS — BODY MASS INDEX: 26.51 KG/M2 | WEIGHT: 179.02 LBS | HEIGHT: 69 IN

## 2021-08-18 DIAGNOSIS — R77.8: ICD-10-CM

## 2021-08-18 DIAGNOSIS — Z20.822: ICD-10-CM

## 2021-08-18 DIAGNOSIS — I50.32: ICD-10-CM

## 2021-08-18 DIAGNOSIS — J44.9: ICD-10-CM

## 2021-08-18 DIAGNOSIS — Z66: ICD-10-CM

## 2021-08-18 DIAGNOSIS — Z92.3: ICD-10-CM

## 2021-08-18 DIAGNOSIS — I21.4: Primary | ICD-10-CM

## 2021-08-18 DIAGNOSIS — Z88.8: ICD-10-CM

## 2021-08-18 DIAGNOSIS — E87.1: ICD-10-CM

## 2021-08-18 DIAGNOSIS — I11.0: ICD-10-CM

## 2021-08-18 DIAGNOSIS — C61: ICD-10-CM

## 2021-08-18 DIAGNOSIS — C79.51: ICD-10-CM

## 2021-08-18 DIAGNOSIS — Z87.891: ICD-10-CM

## 2021-08-18 LAB
ALBUMIN SERPL BCP-MCNC: 3 G/DL (ref 3.4–5)
ALBUMIN/GLOB SERPL: 0.9 {RATIO} (ref 0.8–1.8)
ALT SERPL W P-5'-P-CCNC: 30 U/L (ref 12–78)
ANION GAP SERPL CALCULATED.4IONS-SCNC: 6 MMOL/L (ref 6–16)
AST SERPL W P-5'-P-CCNC: 37 U/L (ref 12–37)
BASOPHILS # BLD: 0.12 K/MM3 (ref 0–0.23)
BASOPHILS NFR BLD: 1 % (ref 0–2)
BILIRUB SERPL-MCNC: 0.5 MG/DL (ref 0.1–1)
BUN SERPL-MCNC: 42 MG/DL (ref 8–24)
CALCIUM SERPL-MCNC: 7.9 MG/DL (ref 8.5–10.1)
CHLORIDE SERPL-SCNC: 99 MMOL/L (ref 98–108)
CO2 SERPL-SCNC: 25 MMOL/L (ref 21–32)
CREAT SERPL-MCNC: 0.92 MG/DL (ref 0.6–1.2)
DEPRECATED RDW RBC AUTO: 58.8 FL (ref 35.1–46.3)
EOSINOPHIL # BLD: 0.73 K/MM3 (ref 0–0.68)
EOSINOPHIL NFR BLD: 6 % (ref 0–6)
ERYTHROCYTE [DISTWIDTH] IN BLOOD BY AUTOMATED COUNT: 18.6 % (ref 11.7–14.2)
GLOBULIN SER CALC-MCNC: 3.5 G/DL (ref 2.2–4)
GLUCOSE SERPL-MCNC: 92 MG/DL (ref 70–99)
HCT VFR BLD AUTO: 38.2 % (ref 37–53)
HGB BLD-MCNC: 12.3 G/DL (ref 13.5–17.5)
KETONES UR STRIP-MCNC: (no result) MG/DL
LEUKOCYTE ESTERASE UR QL STRIP: (no result)
LYMPHOCYTES # BLD: 1.22 K/MM3 (ref 0.84–5.2)
LYMPHOCYTES NFR BLD: 9 % (ref 21–46)
MAGNESIUM SERPL-MCNC: 2.3 MG/DL (ref 1.6–2.4)
MCHC RBC AUTO-ENTMCNC: 32.2 G/DL (ref 31.5–36.5)
MCV RBC AUTO: 87 FL (ref 80–100)
METAMYELOCYTES # BLD MANUAL: 0.73 K/MM3 (ref 0–0)
METAMYELOCYTES NFR BLD MANUAL: 6 % (ref 0–0)
MONOCYTES # BLD: 0.48 K/MM3 (ref 0.16–1.47)
MONOCYTES NFR BLD: 4 % (ref 4–13)
NEUTS BAND NFR BLD MANUAL: 3 % (ref 0–8)
NEUTS SEG # BLD MANUAL: 8.9 K/MM3 (ref 1.96–9.15)
NEUTS SEG NFR BLD MANUAL: 70 % (ref 41–73)
NRBC # BLD AUTO: 0.09 K/MM3 (ref 0–0.02)
NRBC BLD AUTO-RTO: 0.7 /100 WBC (ref 0–0.2)
PLATELET # BLD AUTO: 137 K/MM3 (ref 150–400)
POTASSIUM SERPL-SCNC: 4.9 MMOL/L (ref 3.5–5.5)
PROT SERPL-MCNC: 6.5 G/DL (ref 6.4–8.2)
PROT UR STRIP-MCNC: (no result) MG/DL
RBC #/AREA URNS HPF: (no result) /HPF (ref 0–2)
SARS-COV-2 RNA RESP QL NAA+PROBE: NEGATIVE
SODIUM SERPL-SCNC: 130 MMOL/L (ref 136–145)
SP GR SPEC: 1.01 (ref 1–1.02)
TOTAL CELLS COUNTED BLD: 100
TROPONIN I SERPL-MCNC: 0.39 NG/ML (ref 0–0.04)
UROBILINOGEN UR STRIP-MCNC: (no result) MG/DL
VARIANT LYMPHS NFR BLD MANUAL: 1 % (ref 0–0)
WBC #/AREA URNS HPF: (no result) /HPF (ref 0–5)

## 2021-08-18 PROCEDURE — U0004 COV-19 TEST NON-CDC HGH THRU: HCPCS

## 2021-08-18 PROCEDURE — A9270 NON-COVERED ITEM OR SERVICE: HCPCS

## 2021-08-18 PROCEDURE — G0378 HOSPITAL OBSERVATION PER HR: HCPCS

## 2021-08-19 NOTE — NUR
DISCHARGE SUMMARY:
PATIENT DENIED CHEST PAIN OR DISCOMFORT THROUGHOUT THE SHIFT. PATIENT
AMBULATED WITHOUT SHORTNESS OF BREATH OR CHEST PAIN. PATIENT DENIES DIZZINESS
AND NUMBNESS/TINGLING IN UPPER EXTREMITIES. ECHO WAS PERFORMED AT BEDSIDE.
PER ORDERS PATIENT IS READY FOR DISCHARGE. DISCHARGE INSTRUCTIONS AND
EDUCATION DISCUSSED WITH THE PATIENT'S WIFE OVER THE PHONE.  ALL QUESTIONS AND
CONCERNS ADDRESSED AT THIS TIME. PATIENT'S WIFE IS AWARE SHE CAN CALL WITH
QUESTIONS LATER TODAY. PATIENT STABLE AT TIME OF DISCHARGE. PATIENT DISCHARGED
IN WHEELCHAIR WITH RN. PATIENT DENIED NEEDS AT DISCHARGE.

## 2021-08-19 NOTE — NUR
PT continues on heparin GTT for critically elevated troponins. Cancer with
extensive mets viewable on CXR earlier. on oxygen 2 l nc currently. Denies
acute distress, asking for food because he has been unable to eatfor 2 days
prior to admit. 1 Liter NS running. Tele monitor shows bigeminy or cuplets. PT
had muhammad cath placed in ER drains good amt of clear dark yellow urine. PT
see's [facility] for treatment of advanced metastatic prostate cancer every few
months. Tx with tylenol 650 mg q 6 hrs prn low back pain. PT has bony mets
also. DNR band in place.

## 2022-01-06 ENCOUNTER — HOSPITAL ENCOUNTER (INPATIENT)
Dept: HOSPITAL 95 - ER | Age: 77
LOS: 3 days | Discharge: HOME HEALTH | DRG: 871 | End: 2022-01-09
Attending: HOSPITALIST | Admitting: HOSPITALIST
Payer: COMMERCIAL

## 2022-01-06 VITALS — HEIGHT: 69 IN | BODY MASS INDEX: 26.61 KG/M2 | WEIGHT: 179.68 LBS

## 2022-01-06 DIAGNOSIS — J44.9: ICD-10-CM

## 2022-01-06 DIAGNOSIS — N39.0: ICD-10-CM

## 2022-01-06 DIAGNOSIS — Z92.3: ICD-10-CM

## 2022-01-06 DIAGNOSIS — Z90.49: ICD-10-CM

## 2022-01-06 DIAGNOSIS — B96.1: ICD-10-CM

## 2022-01-06 DIAGNOSIS — I11.0: ICD-10-CM

## 2022-01-06 DIAGNOSIS — C61: ICD-10-CM

## 2022-01-06 DIAGNOSIS — D50.9: ICD-10-CM

## 2022-01-06 DIAGNOSIS — I50.23: ICD-10-CM

## 2022-01-06 DIAGNOSIS — A41.59: Primary | ICD-10-CM

## 2022-01-06 DIAGNOSIS — U07.1: ICD-10-CM

## 2022-01-06 DIAGNOSIS — I95.9: ICD-10-CM

## 2022-01-06 DIAGNOSIS — Z79.899: ICD-10-CM

## 2022-01-06 DIAGNOSIS — F03.90: ICD-10-CM

## 2022-01-06 DIAGNOSIS — C79.9: ICD-10-CM

## 2022-01-06 DIAGNOSIS — J96.21: ICD-10-CM

## 2022-01-06 DIAGNOSIS — Z88.8: ICD-10-CM

## 2022-01-06 DIAGNOSIS — Z79.82: ICD-10-CM

## 2022-01-06 DIAGNOSIS — Z87.891: ICD-10-CM

## 2022-01-06 DIAGNOSIS — G93.41: ICD-10-CM

## 2022-01-06 DIAGNOSIS — Z98.890: ICD-10-CM

## 2022-01-06 DIAGNOSIS — Z66: ICD-10-CM

## 2022-01-06 LAB
ALBUMIN SERPL BCP-MCNC: 2.7 G/DL (ref 3.4–5)
ALBUMIN/GLOB SERPL: 0.9 {RATIO} (ref 0.8–1.8)
ALT SERPL W P-5'-P-CCNC: 15 U/L (ref 12–78)
ANION GAP SERPL CALCULATED.4IONS-SCNC: 3 MMOL/L (ref 6–16)
AST SERPL W P-5'-P-CCNC: 21 U/L (ref 12–37)
BASOPHILS # BLD: 0.12 K/MM3 (ref 0–0.23)
BASOPHILS NFR BLD: 3 % (ref 0–2)
BILIRUB SERPL-MCNC: 0.2 MG/DL (ref 0.1–1)
BUN SERPL-MCNC: 21 MG/DL (ref 8–24)
CALCIUM SERPL-MCNC: 8.6 MG/DL (ref 8.5–10.1)
CHLORIDE SERPL-SCNC: 107 MMOL/L (ref 98–108)
CO2 SERPL-SCNC: 28 MMOL/L (ref 21–32)
CREAT SERPL-MCNC: 1.15 MG/DL (ref 0.6–1.2)
CRP SERPL-MCNC: 1.12 MG/DL (ref 0–0.3)
DEPRECATED RDW RBC AUTO: 58.1 FL (ref 35.1–46.3)
EOSINOPHIL # BLD: 0.12 K/MM3 (ref 0–0.68)
EOSINOPHIL NFR BLD: 3 % (ref 0–6)
ERYTHROCYTE [DISTWIDTH] IN BLOOD BY AUTOMATED COUNT: 17 % (ref 11.7–14.2)
GLOBULIN SER CALC-MCNC: 3 G/DL (ref 2.2–4)
GLUCOSE SERPL-MCNC: 98 MG/DL (ref 70–99)
HCT VFR BLD AUTO: 24.9 % (ref 37–53)
HGB BLD-MCNC: 7.9 G/DL (ref 13.5–17.5)
LEUKOCYTE ESTERASE UR QL STRIP: (no result)
LYMPHOCYTES # BLD: 0.41 K/MM3 (ref 0.84–5.2)
LYMPHOCYTES NFR BLD: 10 % (ref 21–46)
MCHC RBC AUTO-ENTMCNC: 31.7 G/DL (ref 31.5–36.5)
MCV RBC AUTO: 94 FL (ref 80–100)
METAMYELOCYTES # BLD MANUAL: 0.12 K/MM3 (ref 0–0)
METAMYELOCYTES NFR BLD MANUAL: 3 % (ref 0–0)
MONOCYTES # BLD: 0.25 K/MM3 (ref 0.16–1.47)
MONOCYTES NFR BLD: 6 % (ref 4–13)
MYELOCYTES # BLD MANUAL: 0.08 K/MM3 (ref 0–0)
MYELOCYTES NFR BLD MANUAL: 2 % (ref 0–0)
NEUTS BAND NFR BLD MANUAL: 3 % (ref 0–8)
NEUTS SEG # BLD MANUAL: 3.05 K/MM3 (ref 1.96–9.15)
NEUTS SEG NFR BLD MANUAL: 70 % (ref 41–73)
NRBC # BLD AUTO: 0.08 K/MM3 (ref 0–0.02)
NRBC BLD AUTO-RTO: 1.9 /100 WBC (ref 0–0.2)
PH BLDV: 7.42 [PH] (ref 7.34–7.37)
PLATELET # BLD AUTO: 188 K/MM3 (ref 150–400)
POTASSIUM SERPL-SCNC: 3.5 MMOL/L (ref 3.5–5.5)
PROT SERPL-MCNC: 5.7 G/DL (ref 6.4–8.2)
PROT UR STRIP-MCNC: (no result) MG/DL
SODIUM SERPL-SCNC: 138 MMOL/L (ref 136–145)
SP GR SPEC: 1.01 (ref 1–1.02)
TOTAL CELLS COUNTED BLD: 100
TROPONIN I SERPL-MCNC: <0.015 NG/ML (ref 0–0.04)
UROBILINOGEN UR STRIP-MCNC: (no result) MG/DL
WBC #/AREA URNS HPF: (no result) /HPF (ref 0–5)

## 2022-01-06 PROCEDURE — 8E0ZXY6 ISOLATION: ICD-10-PCS | Performed by: HOSPITALIST

## 2022-01-06 PROCEDURE — G0378 HOSPITAL OBSERVATION PER HR: HCPCS

## 2022-01-06 PROCEDURE — 3E0333Z INTRODUCTION OF ANTI-INFLAMMATORY INTO PERIPHERAL VEIN, PERCUTANEOUS APPROACH: ICD-10-PCS | Performed by: HOSPITALIST

## 2022-01-06 PROCEDURE — A9270 NON-COVERED ITEM OR SERVICE: HCPCS

## 2022-01-06 NOTE — NUR
PRIOR TO PUTTING DNR BRACELET ON PATIENT STATED " I WANT TO BE RESUSCIATED I
JUST DONT WANT TO LIVE ON A BREATHING MACHINE. " BRACLET NOT APPLIED. PATIENT
IS ALERT AND ORIENTATED, ABLE TO MAKE NEEDS KNOWN, HEART IS SR-ST ,
LUNGS ARE DIMINISHED ON 2L NC % RR 15-17 DESATURATIONS IN LOW 80'S WHEN
O2 IS REMOVED, PATIENT STRAIGHT CATHS HIMSELF AT HOME,

## 2022-01-07 LAB
ANION GAP SERPL CALCULATED.4IONS-SCNC: 9 MMOL/L (ref 6–16)
BASOPHILS # BLD: 0 K/MM3 (ref 0–0.23)
BASOPHILS NFR BLD: 0 % (ref 0–2)
BUN SERPL-MCNC: 21 MG/DL (ref 8–24)
CALCIUM SERPL-MCNC: 8.7 MG/DL (ref 8.5–10.1)
CHLORIDE SERPL-SCNC: 107 MMOL/L (ref 98–108)
CO2 SERPL-SCNC: 24 MMOL/L (ref 21–32)
CREAT SERPL-MCNC: 0.94 MG/DL (ref 0.6–1.2)
DEPRECATED RDW RBC AUTO: 60.3 FL (ref 35.1–46.3)
EOSINOPHIL # BLD: 0.09 K/MM3 (ref 0–0.68)
EOSINOPHIL NFR BLD: 2 % (ref 0–6)
ERYTHROCYTE [DISTWIDTH] IN BLOOD BY AUTOMATED COUNT: 17.2 % (ref 11.7–14.2)
FERRITIN SERPL-MCNC: 561 NG/ML (ref 26–388)
GLUCOSE SERPL-MCNC: 115 MG/DL (ref 70–99)
HCT VFR BLD AUTO: 25.9 % (ref 37–53)
HGB BLD-MCNC: 7.9 G/DL (ref 13.5–17.5)
LYMPHOCYTES # BLD: 0.59 K/MM3 (ref 0.84–5.2)
LYMPHOCYTES NFR BLD: 12 % (ref 21–46)
MAGNESIUM SERPL-MCNC: 2.1 MG/DL (ref 1.6–2.4)
MCHC RBC AUTO-ENTMCNC: 30.5 G/DL (ref 31.5–36.5)
MCV RBC AUTO: 96 FL (ref 80–100)
METAMYELOCYTES # BLD MANUAL: 0.24 K/MM3 (ref 0–0)
METAMYELOCYTES NFR BLD MANUAL: 5 % (ref 0–0)
MONOCYTES # BLD: 0.29 K/MM3 (ref 0.16–1.47)
MONOCYTES NFR BLD: 6 % (ref 4–13)
MYELOCYTES # BLD MANUAL: 0.19 K/MM3 (ref 0–0)
MYELOCYTES NFR BLD MANUAL: 4 % (ref 0–0)
NEUTS BAND NFR BLD MANUAL: 10 % (ref 0–8)
NEUTS SEG # BLD MANUAL: 3.47 K/MM3 (ref 1.96–9.15)
NEUTS SEG NFR BLD MANUAL: 60 % (ref 41–73)
NRBC # BLD AUTO: 0.03 K/MM3 (ref 0–0.02)
NRBC BLD AUTO-RTO: 0.6 /100 WBC (ref 0–0.2)
PLATELET # BLD AUTO: 198 K/MM3 (ref 150–400)
POTASSIUM SERPL-SCNC: 4.4 MMOL/L (ref 3.5–5.5)
PROMYELOCYTES # BLD MANUAL: 0.04 K/MM3 (ref 0–0)
PROMYELOCYTES NFR BLD MANUAL: 1 % (ref 0–0)
SODIUM SERPL-SCNC: 140 MMOL/L (ref 136–145)
TIBC SERPL-MCNC: 249 UG/DL (ref 250–450)
TOTAL CELLS COUNTED BLD: 100

## 2022-01-07 NOTE — NUR
PATIENT REQUIRED STRAIGHT CATH X 1 THIS SHIFT PER PATIENT REQUEST, PATIENT
STRAIGHT CATHS AT HOME WITH A SPECCIAL CATHETER, PATIENT GOING TO ASK WIFE TO
BRING IN THE MORNING, HAD DARK CLOUDY YELLOW 500MLS.

## 2022-01-07 NOTE — NUR
SHIFT SUMMARY
 
PATIENT ADMITTED FROM ER AT 1600. PATIENT SETTLED INTO ROOM. PATIENT DENIES
PAIN, NAUSEA, AND SHORTNESS OF BREATH. PATIENT IS A SBA TO THE BATHROOM.
PATIENT IS ON 2L VIA N/C. PATIENT IS SATURATING AT 99%. PATIENT WAS QUITE
SLEEPY WHEN HE CAME UP AND NAPPED FOR THE REST OF MY SHIFT. PATIENT WIFE
UPDATED. PATIENT BLADDER SCANNED UPON ARRIVAL WITH 661MLS IN BLADDER. PATIENT
STRAIGHT CATHED WITH OUTPUT OF 800ML. PATIENT IS EATING AND DRINKING WELL.
PATIENT IS PLEASANT AND COOPERATIVE WITH CARE.

## 2022-01-08 LAB
ANION GAP SERPL CALCULATED.4IONS-SCNC: 9 MMOL/L (ref 6–16)
BASOPHILS # BLD AUTO: 0.04 K/MM3 (ref 0–0.23)
BASOPHILS # BLD: 0.05 K/MM3 (ref 0–0.23)
BASOPHILS NFR BLD AUTO: 1 % (ref 0–2)
BASOPHILS NFR BLD: 1 % (ref 0–2)
BUN SERPL-MCNC: 22 MG/DL (ref 8–24)
CALCIUM SERPL-MCNC: 8.3 MG/DL (ref 8.5–10.1)
CHLORIDE SERPL-SCNC: 108 MMOL/L (ref 98–108)
CO2 SERPL-SCNC: 24 MMOL/L (ref 21–32)
CREAT SERPL-MCNC: 0.78 MG/DL (ref 0.6–1.2)
DEPRECATED RDW RBC AUTO: 61 FL (ref 35.1–46.3)
EOSINOPHIL # BLD AUTO: 0.04 K/MM3 (ref 0–0.68)
EOSINOPHIL # BLD: 0 K/MM3 (ref 0–0.68)
EOSINOPHIL NFR BLD AUTO: 1 % (ref 0–6)
EOSINOPHIL NFR BLD: 0 % (ref 0–6)
ERYTHROCYTE [DISTWIDTH] IN BLOOD BY AUTOMATED COUNT: 17.2 % (ref 11.7–14.2)
GLUCOSE SERPL-MCNC: 93 MG/DL (ref 70–99)
HCT VFR BLD AUTO: 26.3 % (ref 37–53)
HGB BLD-MCNC: 8 G/DL (ref 13.5–17.5)
IMM GRANULOCYTES # BLD AUTO: 0.36 K/MM3 (ref 0–0.1)
IMM GRANULOCYTES NFR BLD AUTO: 7 % (ref 0–1)
LYMPHOCYTES # BLD AUTO: 0.93 K/MM3 (ref 0.84–5.2)
LYMPHOCYTES # BLD: 0.89 K/MM3 (ref 0.84–5.2)
LYMPHOCYTES NFR BLD AUTO: 18 % (ref 21–46)
LYMPHOCYTES NFR BLD: 17 % (ref 21–46)
MCHC RBC AUTO-ENTMCNC: 30.4 G/DL (ref 31.5–36.5)
MCV RBC AUTO: 97 FL (ref 80–100)
MONOCYTES # BLD AUTO: 0.89 K/MM3 (ref 0.16–1.47)
MONOCYTES # BLD: 0.68 K/MM3 (ref 0.16–1.47)
MONOCYTES NFR BLD AUTO: 17 % (ref 4–13)
MONOCYTES NFR BLD: 13 % (ref 4–13)
MYELOCYTES # BLD MANUAL: 0.21 K/MM3 (ref 0–0)
MYELOCYTES NFR BLD MANUAL: 4 % (ref 0–0)
NEUTROPHILS # BLD AUTO: 3.03 K/MM3 (ref 1.96–9.15)
NEUTROPHILS NFR BLD AUTO: 57 % (ref 41–73)
NEUTS BAND NFR BLD MANUAL: 4 % (ref 0–8)
NEUTS SEG # BLD MANUAL: 3.43 K/MM3 (ref 1.96–9.15)
NEUTS SEG NFR BLD MANUAL: 61 % (ref 41–73)
NRBC # BLD AUTO: 0.03 K/MM3 (ref 0–0.02)
NRBC BLD AUTO-RTO: 0.6 /100 WBC (ref 0–0.2)
PLATELET # BLD AUTO: 173 K/MM3 (ref 150–400)
POTASSIUM SERPL-SCNC: 3.5 MMOL/L (ref 3.5–5.5)
SODIUM SERPL-SCNC: 141 MMOL/L (ref 136–145)
TOTAL CELLS COUNTED BLD: 100

## 2022-01-08 NOTE — NUR
SHIFT SUMMARY: PT IS A/OX4. HAS BEEN INDEPENDENT IN ROOM, CALL FOR NEEDS VERY
WELL. TELE: SR/93 W/ SOME PVCs. DAYSHIFT HE REFUSED REMDESIVIR. HAS TAKEN ALL
OTHER SCHEDULED MEDS DURING NOC SHIFT. ST. CATHED 3 TIMES THIS SHIFT. CALL
LIGHT IS WITHIN REACH.

## 2022-01-09 LAB
ANION GAP SERPL CALCULATED.4IONS-SCNC: 7 MMOL/L (ref 6–16)
BASOPHILS # BLD: 0.04 K/MM3 (ref 0–0.23)
BASOPHILS NFR BLD: 1 % (ref 0–2)
BUN SERPL-MCNC: 21 MG/DL (ref 8–24)
CALCIUM SERPL-MCNC: 8.5 MG/DL (ref 8.5–10.1)
CHLORIDE SERPL-SCNC: 108 MMOL/L (ref 98–108)
CO2 SERPL-SCNC: 25 MMOL/L (ref 21–32)
CREAT SERPL-MCNC: 0.79 MG/DL (ref 0.6–1.2)
DEPRECATED RDW RBC AUTO: 60 FL (ref 35.1–46.3)
EOSINOPHIL # BLD: 0.04 K/MM3 (ref 0–0.68)
EOSINOPHIL NFR BLD: 1 % (ref 0–6)
ERYTHROCYTE [DISTWIDTH] IN BLOOD BY AUTOMATED COUNT: 17.2 % (ref 11.7–14.2)
GLUCOSE SERPL-MCNC: 89 MG/DL (ref 70–99)
HCT VFR BLD AUTO: 25.6 % (ref 37–53)
HGB BLD-MCNC: 7.9 G/DL (ref 13.5–17.5)
LYMPHOCYTES # BLD: 1.07 K/MM3 (ref 0.84–5.2)
LYMPHOCYTES NFR BLD: 23 % (ref 21–46)
MCHC RBC AUTO-ENTMCNC: 30.9 G/DL (ref 31.5–36.5)
MCV RBC AUTO: 95 FL (ref 80–100)
MONOCYTES # BLD: 0.6 K/MM3 (ref 0.16–1.47)
MONOCYTES NFR BLD: 13 % (ref 4–13)
MYELOCYTES # BLD MANUAL: 0.18 K/MM3 (ref 0–0)
MYELOCYTES NFR BLD MANUAL: 4 % (ref 0–0)
NEUTS BAND NFR BLD MANUAL: 5 % (ref 0–8)
NEUTS SEG # BLD MANUAL: 2.72 K/MM3 (ref 1.96–9.15)
NEUTS SEG NFR BLD MANUAL: 53 % (ref 41–73)
NRBC # BLD AUTO: 0.03 K/MM3 (ref 0–0.02)
NRBC BLD AUTO-RTO: 0.6 /100 WBC (ref 0–0.2)
PLATELET # BLD AUTO: 154 K/MM3 (ref 150–400)
POTASSIUM SERPL-SCNC: 3.8 MMOL/L (ref 3.5–5.5)
SODIUM SERPL-SCNC: 140 MMOL/L (ref 136–145)
TOTAL CELLS COUNTED BLD: 100

## 2022-01-09 NOTE — NUR
DISCHARGE NOTE
PT A&OX4, HARD OF HEARING. MAKES NEEDS KNOWN APPROPRIATRLY.
ON 1.L NC O2; MAINTAINED ON CONTINUOUS POX, SATING
MID TO HIGH 90, OCASSIONALLY O2 READING LEVEL DROPS TO 88-89% WITH ACTIVITY;
RECOVERS SPONTANEOUSLY. NO NOTED DISTRESS. VSS. MD SEES PATIENT THIS AM.
DISCHARGE ORDER IN FOR PATIENT. DISCHARGE INSTRUCTIONS AND PAPERWORK GIVEN TO
PATIENT. WIFE AT BEDSIDE. QUESTIONS AND CONCERNS ADDRESSED. PIV REMOVED, GAUGE
DRESSING APPLIED TO SITE. NO S/S OF BLEED. TELE MONITOR DISCONTINUED. PATIENT
LEAVES FLOOR VIA WHEELCHAIR WITH NURSING STAFF, WITH WIFE AT BEDSIDE.

## 2022-01-09 NOTE — NUR
PATIENT RESTED WELL MOST OF THE NIGHT. AWAKEDNED ONCE TO EREQUEST PAIN
MEDICATION FOR A HEADACHE. MEDICATED PER MAR. UP AMBULATING AND PERFORMING
ADLS THIS AM. VERBALIZED NO QUESTIONS OR CONCERNS. VIATLS REVIEWED. CALL LIGHT
IN REACH.

## 2022-02-20 ENCOUNTER — HOSPITAL ENCOUNTER (EMERGENCY)
Dept: HOSPITAL 95 - ER | Age: 77
Discharge: HOME | End: 2022-02-20
Payer: COMMERCIAL

## 2022-02-20 VITALS — WEIGHT: 176 LBS | BODY MASS INDEX: 27.62 KG/M2 | HEIGHT: 67 IN

## 2022-02-20 DIAGNOSIS — I11.0: ICD-10-CM

## 2022-02-20 DIAGNOSIS — Z85.46: ICD-10-CM

## 2022-02-20 DIAGNOSIS — Z79.899: ICD-10-CM

## 2022-02-20 DIAGNOSIS — N39.0: Primary | ICD-10-CM

## 2022-02-20 DIAGNOSIS — Z85.830: ICD-10-CM

## 2022-02-20 DIAGNOSIS — Z87.891: ICD-10-CM

## 2022-02-20 DIAGNOSIS — Z88.8: ICD-10-CM

## 2022-02-20 DIAGNOSIS — I50.9: ICD-10-CM

## 2022-02-20 LAB
ALBUMIN SERPL BCP-MCNC: 3.2 G/DL (ref 3.4–5)
ALBUMIN/GLOB SERPL: 0.8 {RATIO} (ref 0.8–1.8)
ALT SERPL W P-5'-P-CCNC: 19 U/L (ref 12–78)
ANION GAP SERPL CALCULATED.4IONS-SCNC: 5 MMOL/L (ref 6–16)
AST SERPL W P-5'-P-CCNC: 43 U/L (ref 12–37)
BASOPHILS # BLD AUTO: 0.08 K/MM3 (ref 0–0.23)
BASOPHILS NFR BLD AUTO: 1 % (ref 0–2)
BILIRUB SERPL-MCNC: 0.3 MG/DL (ref 0.1–1)
BUN SERPL-MCNC: 29 MG/DL (ref 8–24)
CALCIUM SERPL-MCNC: 10 MG/DL (ref 8.5–10.1)
CHLORIDE SERPL-SCNC: 103 MMOL/L (ref 98–108)
CO2 SERPL-SCNC: 28 MMOL/L (ref 21–32)
CREAT SERPL-MCNC: 1.13 MG/DL (ref 0.6–1.2)
DEPRECATED RDW RBC AUTO: 58.4 FL (ref 35.1–46.3)
EOSINOPHIL # BLD AUTO: 0.25 K/MM3 (ref 0–0.68)
EOSINOPHIL NFR BLD AUTO: 3 % (ref 0–6)
ERYTHROCYTE [DISTWIDTH] IN BLOOD BY AUTOMATED COUNT: 17.2 % (ref 11.7–14.2)
GLOBULIN SER CALC-MCNC: 3.8 G/DL (ref 2.2–4)
GLUCOSE SERPL-MCNC: 108 MG/DL (ref 70–99)
HCT VFR BLD AUTO: 24.9 % (ref 37–53)
HGB BLD-MCNC: 7.6 G/DL (ref 13.5–17.5)
IMM GRANULOCYTES # BLD AUTO: 0.13 K/MM3 (ref 0–0.1)
IMM GRANULOCYTES NFR BLD AUTO: 2 % (ref 0–1)
LEUKOCYTE ESTERASE UR QL STRIP: (no result)
LYMPHOCYTES # BLD AUTO: 0.89 K/MM3 (ref 0.84–5.2)
LYMPHOCYTES NFR BLD AUTO: 11 % (ref 21–46)
MCHC RBC AUTO-ENTMCNC: 30.5 G/DL (ref 31.5–36.5)
MCV RBC AUTO: 93 FL (ref 80–100)
MONOCYTES # BLD AUTO: 1.17 K/MM3 (ref 0.16–1.47)
MONOCYTES NFR BLD AUTO: 15 % (ref 4–13)
NEUTROPHILS # BLD AUTO: 5.48 K/MM3 (ref 1.96–9.15)
NEUTROPHILS NFR BLD AUTO: 69 % (ref 41–73)
NRBC # BLD AUTO: 0 K/MM3 (ref 0–0.02)
NRBC BLD AUTO-RTO: 0 /100 WBC (ref 0–0.2)
PLATELET # BLD AUTO: 226 K/MM3 (ref 150–400)
POTASSIUM SERPL-SCNC: 4.9 MMOL/L (ref 3.5–5.5)
PROT SERPL-MCNC: 7 G/DL (ref 6.4–8.2)
PROT UR STRIP-MCNC: (no result) MG/DL
SODIUM SERPL-SCNC: 136 MMOL/L (ref 136–145)
SP GR SPEC: 1.02 (ref 1–1.02)
UROBILINOGEN UR STRIP-MCNC: (no result) MG/DL

## 2022-03-03 ENCOUNTER — HOSPITAL ENCOUNTER (OUTPATIENT)
Dept: HOSPITAL 95 - ER | Age: 77
Setting detail: OBSERVATION
LOS: 1 days | Discharge: HOME | End: 2022-03-04
Attending: INTERNAL MEDICINE | Admitting: INTERNAL MEDICINE
Payer: COMMERCIAL

## 2022-03-03 VITALS — HEIGHT: 69 IN | BODY MASS INDEX: 22.22 KG/M2 | WEIGHT: 150 LBS

## 2022-03-03 DIAGNOSIS — Z87.891: ICD-10-CM

## 2022-03-03 DIAGNOSIS — I50.22: ICD-10-CM

## 2022-03-03 DIAGNOSIS — I11.0: ICD-10-CM

## 2022-03-03 DIAGNOSIS — G25.81: ICD-10-CM

## 2022-03-03 DIAGNOSIS — C79.9: ICD-10-CM

## 2022-03-03 DIAGNOSIS — Z66: ICD-10-CM

## 2022-03-03 DIAGNOSIS — N17.9: ICD-10-CM

## 2022-03-03 DIAGNOSIS — D64.9: ICD-10-CM

## 2022-03-03 DIAGNOSIS — Z88.8: ICD-10-CM

## 2022-03-03 DIAGNOSIS — K40.30: Primary | ICD-10-CM

## 2022-03-03 DIAGNOSIS — U07.1: ICD-10-CM

## 2022-03-03 DIAGNOSIS — J44.9: ICD-10-CM

## 2022-03-03 DIAGNOSIS — C61: ICD-10-CM

## 2022-03-03 LAB
ALBUMIN SERPL BCP-MCNC: 3.1 G/DL (ref 3.4–5)
ALBUMIN/GLOB SERPL: 0.7 {RATIO} (ref 0.8–1.8)
ALT SERPL W P-5'-P-CCNC: 18 U/L (ref 12–78)
ANION GAP SERPL CALCULATED.4IONS-SCNC: 6 MMOL/L (ref 6–16)
AST SERPL W P-5'-P-CCNC: 60 U/L (ref 12–37)
BASOPHILS # BLD AUTO: 0.05 K/MM3 (ref 0–0.23)
BASOPHILS NFR BLD AUTO: 1 % (ref 0–2)
BILIRUB SERPL-MCNC: 0.3 MG/DL (ref 0.1–1)
BUN SERPL-MCNC: 35 MG/DL (ref 8–24)
CALCIUM SERPL-MCNC: 10.5 MG/DL (ref 8.5–10.1)
CHLORIDE SERPL-SCNC: 97 MMOL/L (ref 98–108)
CO2 SERPL-SCNC: 30 MMOL/L (ref 21–32)
CREAT SERPL-MCNC: 1.21 MG/DL (ref 0.6–1.2)
DEPRECATED RDW RBC AUTO: 57 FL (ref 35.1–46.3)
EOSINOPHIL # BLD AUTO: 0.25 K/MM3 (ref 0–0.68)
EOSINOPHIL NFR BLD AUTO: 3 % (ref 0–6)
ERYTHROCYTE [DISTWIDTH] IN BLOOD BY AUTOMATED COUNT: 17.6 % (ref 11.7–14.2)
FLUAV RNA SPEC QL NAA+PROBE: NEGATIVE
FLUBV RNA SPEC QL NAA+PROBE: NEGATIVE
GLOBULIN SER CALC-MCNC: 4.3 G/DL (ref 2.2–4)
GLUCOSE SERPL-MCNC: 109 MG/DL (ref 70–99)
HCT VFR BLD AUTO: 26 % (ref 37–53)
HGB BLD-MCNC: 8.2 G/DL (ref 13.5–17.5)
IMM GRANULOCYTES # BLD AUTO: 0.16 K/MM3 (ref 0–0.1)
IMM GRANULOCYTES NFR BLD AUTO: 2 % (ref 0–1)
LEUKOCYTE ESTERASE UR QL STRIP: (no result)
LYMPHOCYTES # BLD AUTO: 0.85 K/MM3 (ref 0.84–5.2)
LYMPHOCYTES NFR BLD AUTO: 10 % (ref 21–46)
MCHC RBC AUTO-ENTMCNC: 31.5 G/DL (ref 31.5–36.5)
MCV RBC AUTO: 88 FL (ref 80–100)
MONOCYTES # BLD AUTO: 1.24 K/MM3 (ref 0.16–1.47)
MONOCYTES NFR BLD AUTO: 14 % (ref 4–13)
NEUTROPHILS # BLD AUTO: 6.27 K/MM3 (ref 1.96–9.15)
NEUTROPHILS NFR BLD AUTO: 71 % (ref 41–73)
NRBC # BLD AUTO: 0 K/MM3 (ref 0–0.02)
NRBC BLD AUTO-RTO: 0 /100 WBC (ref 0–0.2)
PLATELET # BLD AUTO: 214 K/MM3 (ref 150–400)
POTASSIUM SERPL-SCNC: 4.7 MMOL/L (ref 3.5–5.5)
PROT SERPL-MCNC: 7.4 G/DL (ref 6.4–8.2)
PROT UR STRIP-MCNC: (no result) MG/DL
RBC #/AREA URNS HPF: (no result) /HPF (ref 0–2)
RSV RNA SPEC QL NAA+PROBE: NEGATIVE
SARS-COV-2 RNA RESP QL NAA+PROBE: POSITIVE
SODIUM SERPL-SCNC: 133 MMOL/L (ref 136–145)
SP GR SPEC: 1.02 (ref 1–1.02)
UROBILINOGEN UR STRIP-MCNC: (no result) MG/DL

## 2022-03-03 PROCEDURE — A9270 NON-COVERED ITEM OR SERVICE: HCPCS

## 2022-03-03 PROCEDURE — 0YU60JZ SUPPLEMENT LEFT INGUINAL REGION WITH SYNTHETIC SUBSTITUTE, OPEN APPROACH: ICD-10-PCS | Performed by: STUDENT IN AN ORGANIZED HEALTH CARE EDUCATION/TRAINING PROGRAM

## 2022-03-04 LAB
ALBUMIN SERPL BCP-MCNC: 2.8 G/DL (ref 3.4–5)
ALBUMIN/GLOB SERPL: 0.7 {RATIO} (ref 0.8–1.8)
ALT SERPL W P-5'-P-CCNC: 14 U/L (ref 12–78)
ANION GAP SERPL CALCULATED.4IONS-SCNC: 9 MMOL/L (ref 6–16)
AST SERPL W P-5'-P-CCNC: 41 U/L (ref 12–37)
BASOPHILS # BLD AUTO: 0.07 K/MM3 (ref 0–0.23)
BASOPHILS NFR BLD AUTO: 1 % (ref 0–2)
BILIRUB SERPL-MCNC: 0.4 MG/DL (ref 0.1–1)
BUN SERPL-MCNC: 26 MG/DL (ref 8–24)
CALCIUM SERPL-MCNC: 10.4 MG/DL (ref 8.5–10.1)
CHLORIDE SERPL-SCNC: 99 MMOL/L (ref 98–108)
CO2 SERPL-SCNC: 26 MMOL/L (ref 21–32)
CREAT SERPL-MCNC: 1.02 MG/DL (ref 0.6–1.2)
DEPRECATED RDW RBC AUTO: 55.7 FL (ref 35.1–46.3)
EOSINOPHIL # BLD AUTO: 0.09 K/MM3 (ref 0–0.68)
EOSINOPHIL NFR BLD AUTO: 1 % (ref 0–6)
ERYTHROCYTE [DISTWIDTH] IN BLOOD BY AUTOMATED COUNT: 17.3 % (ref 11.7–14.2)
GLOBULIN SER CALC-MCNC: 3.9 G/DL (ref 2.2–4)
GLUCOSE SERPL-MCNC: 130 MG/DL (ref 70–99)
HCT VFR BLD AUTO: 26.2 % (ref 37–53)
HGB BLD-MCNC: 8.1 G/DL (ref 13.5–17.5)
IMM GRANULOCYTES # BLD AUTO: 0.18 K/MM3 (ref 0–0.1)
IMM GRANULOCYTES NFR BLD AUTO: 2 % (ref 0–1)
LYMPHOCYTES # BLD AUTO: 0.55 K/MM3 (ref 0.84–5.2)
LYMPHOCYTES NFR BLD AUTO: 6 % (ref 21–46)
MCHC RBC AUTO-ENTMCNC: 30.9 G/DL (ref 31.5–36.5)
MCV RBC AUTO: 89 FL (ref 80–100)
MONOCYTES # BLD AUTO: 0.76 K/MM3 (ref 0.16–1.47)
MONOCYTES NFR BLD AUTO: 8 % (ref 4–13)
NEUTROPHILS # BLD AUTO: 8.34 K/MM3 (ref 1.96–9.15)
NEUTROPHILS NFR BLD AUTO: 84 % (ref 41–73)
NRBC # BLD AUTO: 0 K/MM3 (ref 0–0.02)
NRBC BLD AUTO-RTO: 0 /100 WBC (ref 0–0.2)
PLATELET # BLD AUTO: 201 K/MM3 (ref 150–400)
POTASSIUM SERPL-SCNC: 4.6 MMOL/L (ref 3.5–5.5)
PROT SERPL-MCNC: 6.7 G/DL (ref 6.4–8.2)
SODIUM SERPL-SCNC: 134 MMOL/L (ref 136–145)

## 2022-03-04 NOTE — NUR
PT ARRIVES TO FLOOR FROM PACU @2245 FOR HERNIA REPAIR. PT IS ALERT AND
ORIENTED TO PLACE AND SITUATION BUT DISORIENTED TO TIME. HAS HX OF COPD,
PROSTATE CANCER, HTN, CHF AND DEMENTIA. CURENT H&H IS 8.1, HAS PERLA IN PLACE,
TOLERATING CLEAR DIET, PAIN CONTROLLED WITH KELLIE 5MG, TAKES PILLS WHOLE WITH
THINS. WATCHES TV MOST OF SHIFT, DID NOT SLEEP MUCH. IS ABLE TO VERBALIZE
NEEDS. [employer]

## 2022-03-04 NOTE — NUR
DISCHARGE NOTE:
PATIENT WAS EDUCATED ON DISCHARGE INSTRUCTIONS. PATIENT VERBALZIED
UNDERSTANDING OF INSTRUCTIONS. BP MEDICATION WAS FAXED TO HIS PREFERRED
PHARMACY. WIFE WAS EDUCATED ON INSTRUCTIONS AS WELL IN THE CAR.
IV WAS TAKEN OUT AND WNL. PAIN IS MANAGED WITH PO PAIN MEDICATION. PATIENT HAS
BEEN IND. IN THE ROOM. PATIENT IS DRESSED AND HAS ITEMS IN THE ROOM GATHERED.
HE IS TOLERATING PO INTAKE AND IS VOIDING/PASSING GAS.
PATIENT WAS WHEELCHAIRED OUT TO WIFES CAR TO BE TAKEN HOME.

## 2022-03-13 ENCOUNTER — HOSPITAL ENCOUNTER (INPATIENT)
Dept: HOSPITAL 95 - ER | Age: 77
LOS: 8 days | Discharge: HOSPICE | DRG: 871 | End: 2022-03-21
Attending: HOSPITALIST | Admitting: HOSPITALIST
Payer: COMMERCIAL

## 2022-03-13 VITALS — HEIGHT: 73 IN | WEIGHT: 163.8 LBS | BODY MASS INDEX: 21.71 KG/M2

## 2022-03-13 DIAGNOSIS — I95.9: ICD-10-CM

## 2022-03-13 DIAGNOSIS — N39.0: ICD-10-CM

## 2022-03-13 DIAGNOSIS — B96.1: ICD-10-CM

## 2022-03-13 DIAGNOSIS — Z98.890: ICD-10-CM

## 2022-03-13 DIAGNOSIS — I11.0: ICD-10-CM

## 2022-03-13 DIAGNOSIS — Z78.1: ICD-10-CM

## 2022-03-13 DIAGNOSIS — I50.22: ICD-10-CM

## 2022-03-13 DIAGNOSIS — C78.00: ICD-10-CM

## 2022-03-13 DIAGNOSIS — A41.59: Primary | ICD-10-CM

## 2022-03-13 DIAGNOSIS — Z79.899: ICD-10-CM

## 2022-03-13 DIAGNOSIS — D63.0: ICD-10-CM

## 2022-03-13 DIAGNOSIS — K59.00: ICD-10-CM

## 2022-03-13 DIAGNOSIS — G47.00: ICD-10-CM

## 2022-03-13 DIAGNOSIS — R65.20: ICD-10-CM

## 2022-03-13 DIAGNOSIS — J44.0: ICD-10-CM

## 2022-03-13 DIAGNOSIS — C61: ICD-10-CM

## 2022-03-13 DIAGNOSIS — N17.9: ICD-10-CM

## 2022-03-13 DIAGNOSIS — G92.8: ICD-10-CM

## 2022-03-13 DIAGNOSIS — J18.9: ICD-10-CM

## 2022-03-13 DIAGNOSIS — J44.1: ICD-10-CM

## 2022-03-13 DIAGNOSIS — J96.21: ICD-10-CM

## 2022-03-13 DIAGNOSIS — Z86.16: ICD-10-CM

## 2022-03-13 DIAGNOSIS — Z51.5: ICD-10-CM

## 2022-03-13 DIAGNOSIS — Z66: ICD-10-CM

## 2022-03-13 DIAGNOSIS — R33.9: ICD-10-CM

## 2022-03-13 DIAGNOSIS — Z90.49: ICD-10-CM

## 2022-03-13 DIAGNOSIS — I87.2: ICD-10-CM

## 2022-03-13 DIAGNOSIS — Z88.8: ICD-10-CM

## 2022-03-13 DIAGNOSIS — Z20.822: ICD-10-CM

## 2022-03-13 LAB
ALBUMIN SERPL BCP-MCNC: 3 G/DL (ref 3.4–5)
ALBUMIN/GLOB SERPL: 0.7 {RATIO} (ref 0.8–1.8)
ALT SERPL W P-5'-P-CCNC: 17 U/L (ref 12–78)
ANION GAP SERPL CALCULATED.4IONS-SCNC: 4 MMOL/L (ref 6–16)
ANION GAP SERPL CALCULATED.4IONS-SCNC: 7 MMOL/L (ref 6–16)
AST SERPL W P-5'-P-CCNC: 39 U/L (ref 12–37)
BASOPHILS # BLD AUTO: 0.04 K/MM3 (ref 0–0.23)
BASOPHILS NFR BLD AUTO: 0 % (ref 0–2)
BILIRUB SERPL-MCNC: 0.3 MG/DL (ref 0.1–1)
BUN SERPL-MCNC: 27 MG/DL (ref 8–24)
BUN SERPL-MCNC: 43 MG/DL (ref 8–24)
CALCIUM SERPL-MCNC: 10 MG/DL (ref 8.5–10.1)
CALCIUM SERPL-MCNC: 10.5 MG/DL (ref 8.5–10.1)
CHLORIDE SERPL-SCNC: 105 MMOL/L (ref 98–108)
CHLORIDE SERPL-SCNC: 98 MMOL/L (ref 98–108)
CO2 SERPL-SCNC: 27 MMOL/L (ref 21–32)
CO2 SERPL-SCNC: 31 MMOL/L (ref 21–32)
CREAT SERPL-MCNC: 1.02 MG/DL (ref 0.6–1.2)
CREAT SERPL-MCNC: 1.69 MG/DL (ref 0.6–1.2)
DEPRECATED RDW RBC AUTO: 59.5 FL (ref 35.1–46.3)
EOSINOPHIL # BLD AUTO: 0.2 K/MM3 (ref 0–0.68)
EOSINOPHIL NFR BLD AUTO: 2 % (ref 0–6)
ERYTHROCYTE [DISTWIDTH] IN BLOOD BY AUTOMATED COUNT: 18.3 % (ref 11.7–14.2)
FERRITIN SERPL-MCNC: 761 NG/ML (ref 26–388)
FLUAV RNA SPEC QL NAA+PROBE: NEGATIVE
FLUBV RNA SPEC QL NAA+PROBE: NEGATIVE
GLOBULIN SER CALC-MCNC: 4.2 G/DL (ref 2.2–4)
GLUCOSE SERPL-MCNC: 107 MG/DL (ref 70–99)
GLUCOSE SERPL-MCNC: 141 MG/DL (ref 70–99)
HCT VFR BLD AUTO: 25.6 % (ref 37–53)
HGB BLD-MCNC: 7.9 G/DL (ref 13.5–17.5)
IMM GRANULOCYTES # BLD AUTO: 0.36 K/MM3 (ref 0–0.1)
IMM GRANULOCYTES NFR BLD AUTO: 3 % (ref 0–1)
LEUKOCYTE ESTERASE UR QL STRIP: (no result)
LYMPHOCYTES # BLD AUTO: 0.79 K/MM3 (ref 0.84–5.2)
LYMPHOCYTES NFR BLD AUTO: 7 % (ref 21–46)
MAGNESIUM SERPL-MCNC: 2 MG/DL (ref 1.6–2.4)
MCHC RBC AUTO-ENTMCNC: 30.9 G/DL (ref 31.5–36.5)
MCV RBC AUTO: 89 FL (ref 80–100)
MONOCYTES # BLD AUTO: 0.77 K/MM3 (ref 0.16–1.47)
MONOCYTES NFR BLD AUTO: 7 % (ref 4–13)
NEUTROPHILS # BLD AUTO: 8.96 K/MM3 (ref 1.96–9.15)
NEUTROPHILS NFR BLD AUTO: 81 % (ref 41–73)
NRBC # BLD AUTO: 0.02 K/MM3 (ref 0–0.02)
NRBC BLD AUTO-RTO: 0.2 /100 WBC (ref 0–0.2)
PH BLDV: 7.41 [PH] (ref 7.34–7.37)
PLATELET # BLD AUTO: 291 K/MM3 (ref 150–400)
POTASSIUM SERPL-SCNC: 4.5 MMOL/L (ref 3.5–5.5)
POTASSIUM SERPL-SCNC: 4.7 MMOL/L (ref 3.5–5.5)
PROT SERPL-MCNC: 7.2 G/DL (ref 6.4–8.2)
PROT UR STRIP-MCNC: (no result) MG/DL
RBC #/AREA URNS HPF: (no result) /HPF (ref 0–2)
RSV RNA SPEC QL NAA+PROBE: NEGATIVE
SARS-COV-2 RNA RESP QL NAA+PROBE: NEGATIVE
SODIUM SERPL-SCNC: 136 MMOL/L (ref 136–145)
SODIUM SERPL-SCNC: 136 MMOL/L (ref 136–145)
SP GR SPEC: 1.02 (ref 1–1.02)
TIBC SERPL-MCNC: 262 UG/DL (ref 250–450)
TSH SERPL DL<=0.005 MIU/L-ACNC: 0.31 UIU/ML (ref 0.36–4.8)
UROBILINOGEN UR STRIP-MCNC: (no result) MG/DL
WBC #/AREA URNS HPF: (no result) /HPF (ref 0–5)

## 2022-03-13 PROCEDURE — 3E03329 INTRODUCTION OF OTHER ANTI-INFECTIVE INTO PERIPHERAL VEIN, PERCUTANEOUS APPROACH: ICD-10-PCS | Performed by: HOSPITALIST

## 2022-03-13 PROCEDURE — C9113 INJ PANTOPRAZOLE SODIUM, VIA: HCPCS

## 2022-03-13 PROCEDURE — A9270 NON-COVERED ITEM OR SERVICE: HCPCS

## 2022-03-13 PROCEDURE — C1751 CATH, INF, PER/CENT/MIDLINE: HCPCS

## 2022-03-13 PROCEDURE — P9016 RBC LEUKOCYTES REDUCED: HCPCS

## 2022-03-13 PROCEDURE — G0378 HOSPITAL OBSERVATION PER HR: HCPCS

## 2022-03-13 NOTE — NUR
PCU TRANSFER
RESPIRATORY EFFORT APPEARS BETTER AT THIS TIME, SATS REMAINING >92% ON 2L NC.
PT REMAINS HYPOTENSIVE. SL AT THIS TIME PER DR TARANGO. REPORT GIVEN TO PCU JEISON RAMIREZ. WIFE AT BEDSIDE AND NOTIFIED OF TRANSFER, PT ABLE TO TRANSFER TO W/C
AND TRANSPORTED TO PCU.

## 2022-03-13 NOTE — NUR
FINE CRACKLES BILATERALLY IN BASE OF LUNGS. MEKA IN RT CONFIRMED. DR FONTAINE AT
BEDSIDE AND NOTIFIED OF CONDITION.  ORDERED TO CONTINUE FLUIDS & FOCUS ON
GETTING BLOOD PRESSURE UP AT THIS TIME. PRN ALBUTEROL ORDERED.

## 2022-03-13 NOTE — NUR
PT ARRIVED FROM  APROX 1715, ABLE TO STAND AND PIVOT FROM WHEELCHAIR TO
BED, 2L O2 SPO2 97%, ALERT AND ORIENTED, WIFE AT BEDSIDE. SEE DOCUMENTED VS.
PT AND WIFE ORIENTED TO ROOM, CALL LIGHT AND UNIT ROUTINES. WILL CONTINUE TO
MONITOR.

## 2022-03-13 NOTE — NUR
PT REQUESTS HIS HOME MEDICATION PRAMIPEXOLE FOR RESTLESS LEGS. DR FONTAINE
NOTIFIED AND ORDERS RECEIVED.

## 2022-03-13 NOTE — NUR
SPOKE WITH PT'S WIFE ON PHONE, SHE UPDATED ME THAT PATIENT ONLY WEARS TWO
LITERS OXYGEN AT NIGHT AND HAS NOT BEEN REQUIRING OXYGEN DURING THE DAY. SHE
REPORTS LAST NIGHT PATIENT DROPPED TO THE 70'S/80'S AND SHE PLACED HIM ON 3L
UNTIL EMS ARRIVED. SHE ALSO REPORTED TO THIS RN THAT PATIENT HAS BEEN TAKING A
DEWORMER CALLED FENBENDAZOLE, 22MG PO BID. SHE REPORTED THAT HE HAS BEEN
EATING AND THROWING UP FREQUENTLY AS OF LATE, SHE REPORTS A SIGNIFICANT WEIGHT
LOSS AND LACK OF APPETITE. WILL GET A NEW WEIGHT ON PATIENT AS ABLE. NOTIFED
DR. FONTAINE OF WIFES UPDATE. PT CURRENTLY SITTING UP IN BED, NASAL CANULA IN.
PLACED ON CONTINOUS BIOX AT THIS TIME.

## 2022-03-13 NOTE — NUR
PT RESPIRATORY EFFORT HAS INCREASED, RESPIRATIONS AT 30/MIN. WHEN PATIENT IS
TRYING TO TALK HE CAN ONLY GET 1-2 WORDS OUT BETWEEN BREATHS. NO CHANGES
IN LUNG SOUNDS UPPER LUNG SOUNDS REMAIN CLEAR & LOWER DIMINSHED DR FONTAINE
NOTIFIED AND INSTRUCTED TO CALL RT. O2 SATS REAMIN AT 95 % ON 2L NC.

## 2022-03-14 LAB
ALBUMIN SERPL BCP-MCNC: 2.2 G/DL (ref 3.4–5)
ALBUMIN/GLOB SERPL: 0.6 {RATIO} (ref 0.8–1.8)
ALT SERPL W P-5'-P-CCNC: 10 U/L (ref 12–78)
ANION GAP SERPL CALCULATED.4IONS-SCNC: 6 MMOL/L (ref 6–16)
AST SERPL W P-5'-P-CCNC: 30 U/L (ref 12–37)
BASOPHILS # BLD AUTO: 0.04 K/MM3 (ref 0–0.23)
BASOPHILS NFR BLD AUTO: 0 % (ref 0–2)
BILIRUB SERPL-MCNC: 0.2 MG/DL (ref 0.1–1)
BILIRUB SERPL-MCNC: 0.2 MG/DL (ref 0.1–1)
BUN SERPL-MCNC: 27 MG/DL (ref 8–24)
CALCIUM SERPL-MCNC: 10.1 MG/DL (ref 8.5–10.1)
CHLORIDE SERPL-SCNC: 104 MMOL/L (ref 98–108)
CO2 SERPL-SCNC: 29 MMOL/L (ref 21–32)
CREAT SERPL-MCNC: 0.95 MG/DL (ref 0.6–1.2)
DEPRECATED RDW RBC AUTO: 59 FL (ref 35.1–46.3)
EOSINOPHIL # BLD AUTO: 0.04 K/MM3 (ref 0–0.68)
EOSINOPHIL NFR BLD AUTO: 0 % (ref 0–6)
ERYTHROCYTE [DISTWIDTH] IN BLOOD BY AUTOMATED COUNT: 18.2 % (ref 11.7–14.2)
FERRITIN SERPL-MCNC: 719 NG/ML (ref 26–388)
FOLATE (FOLIC ACID), SERUM: 17.1 NG/ML (ref 3–?)
GLOBULIN SER CALC-MCNC: 3.5 G/DL (ref 2.2–4)
GLUCOSE SERPL-MCNC: 107 MG/DL (ref 70–99)
HCT VFR BLD AUTO: 21.3 % (ref 37–53)
HCT VFR BLD AUTO: 24.8 % (ref 37–53)
HGB BLD-MCNC: 6.7 G/DL (ref 13.5–17.5)
HGB BLD-MCNC: 7.6 G/DL (ref 13.5–17.5)
HGB RETIC QN AUTO: 27.9 PG (ref 28.2–36.6)
IMM GRANULOCYTES # BLD AUTO: 0.32 K/MM3 (ref 0–0.1)
IMM GRANULOCYTES NFR BLD AUTO: 2 % (ref 0–1)
IMM RETICS NFR: 22.1 % (ref 2.3–16)
LDH SERPL-CCNC: 160 U/L (ref 100–240)
LYMPHOCYTES # BLD AUTO: 0.84 K/MM3 (ref 0.84–5.2)
LYMPHOCYTES NFR BLD AUTO: 6 % (ref 21–46)
MCHC RBC AUTO-ENTMCNC: 31.5 G/DL (ref 31.5–36.5)
MCV RBC AUTO: 89 FL (ref 80–100)
MONOCYTES # BLD AUTO: 1.38 K/MM3 (ref 0.16–1.47)
MONOCYTES NFR BLD AUTO: 9 % (ref 4–13)
NEUTROPHILS # BLD AUTO: 12.07 K/MM3 (ref 1.96–9.15)
NEUTROPHILS NFR BLD AUTO: 82 % (ref 41–73)
NRBC # BLD AUTO: 0.02 K/MM3 (ref 0–0.02)
NRBC BLD AUTO-RTO: 0.1 /100 WBC (ref 0–0.2)
PLATELET # BLD AUTO: 203 K/MM3 (ref 150–400)
POTASSIUM SERPL-SCNC: 4.5 MMOL/L (ref 3.5–5.5)
PROT SERPL-MCNC: 5.7 G/DL (ref 6.4–8.2)
RETICS/RBC NFR AUTO: 1.54 % (ref 0.5–2.5)
SODIUM SERPL-SCNC: 139 MMOL/L (ref 136–145)
TIBC SERPL-MCNC: 185 UG/DL (ref 250–450)

## 2022-03-14 PROCEDURE — 30233N1 TRANSFUSION OF NONAUTOLOGOUS RED BLOOD CELLS INTO PERIPHERAL VEIN, PERCUTANEOUS APPROACH: ICD-10-PCS | Performed by: HOSPITALIST

## 2022-03-14 NOTE — NUR
SHIFT SUMMARY
 
PT HAS BEEN RESTING IN ROOM, SOME TIME WAS SPENT IN THE BEDSIDE CHAIR AND THE
PT WAS ABLE TO SLEEP FOR APPROXIMATELY AN HOUR AND A HALF AROUND MIDDAY. PT
HAS BEEN ALERT AND ORIENTED X3 AND CAN CARRY AN ORIENTED CONVERSATION BUT WILL
OCCASIONALLY HAVE AUDITORY HALLUCINATIONS AND CARRY A CONVERSATION WITH
SOMEONE WHO IS NOT THERE. WHEN ASKED DIRECTLY ABOUT HALLUCINATIONS PT STATES
"I KNOW THEY ARE NOT REALLY THERE." -117, ALL OTHER VITAL SIGNS HAVE
BEEN STABLE AND WNL.

## 2022-03-14 NOTE — NUR
End of shift summary:
 
Pt without issues overnight, did hit the call lightr frequently(mostly on
accident), VSS BP soft but with good map, 2L NC overnight with sats >90%.
Abdomen pain treated with oxy/ tylenol, afebrile, muhammad cath with adequate
output.
Will continue to monitor
 
Sunny Reyes RN

## 2022-03-15 LAB
ANION GAP SERPL CALCULATED.4IONS-SCNC: 6 MMOL/L (ref 6–16)
BASOPHILS # BLD AUTO: 0.09 K/MM3 (ref 0–0.23)
BASOPHILS # BLD: 0.16 K/MM3 (ref 0–0.23)
BASOPHILS NFR BLD AUTO: 1 % (ref 0–2)
BASOPHILS NFR BLD: 1 % (ref 0–2)
BUN SERPL-MCNC: 24 MG/DL (ref 8–24)
CALCIUM SERPL-MCNC: 10.8 MG/DL (ref 8.5–10.1)
CHLORIDE SERPL-SCNC: 104 MMOL/L (ref 98–108)
CO2 SERPL-SCNC: 29 MMOL/L (ref 21–32)
CREAT SERPL-MCNC: 0.87 MG/DL (ref 0.6–1.2)
DEPRECATED RDW RBC AUTO: 58.4 FL (ref 35.1–46.3)
EOSINOPHIL # BLD AUTO: 0.13 K/MM3 (ref 0–0.68)
EOSINOPHIL # BLD: 0.33 K/MM3 (ref 0–0.68)
EOSINOPHIL NFR BLD AUTO: 1 % (ref 0–6)
EOSINOPHIL NFR BLD: 2 % (ref 0–6)
ERYTHROCYTE [DISTWIDTH] IN BLOOD BY AUTOMATED COUNT: 18.2 % (ref 11.7–14.2)
FOLATE (FOLIC ACID), SERUM: 13.5 NG/ML (ref 3–?)
GLUCOSE SERPL-MCNC: 134 MG/DL (ref 70–99)
HCT VFR BLD AUTO: 25.1 % (ref 37–53)
HCT VFR BLD AUTO: 25.5 % (ref 37–53)
HGB BLD-MCNC: 7.9 G/DL (ref 13.5–17.5)
HGB BLD-MCNC: 7.9 G/DL (ref 13.5–17.5)
IMM GRANULOCYTES # BLD AUTO: 0.86 K/MM3 (ref 0–0.1)
IMM GRANULOCYTES NFR BLD AUTO: 5 % (ref 0–1)
LYMPHOCYTES # BLD AUTO: 0.76 K/MM3 (ref 0.84–5.2)
LYMPHOCYTES # BLD: 0.16 K/MM3 (ref 0.84–5.2)
LYMPHOCYTES NFR BLD AUTO: 5 % (ref 21–46)
LYMPHOCYTES NFR BLD: 1 % (ref 21–46)
MCHC RBC AUTO-ENTMCNC: 31 G/DL (ref 31.5–36.5)
MCV RBC AUTO: 89 FL (ref 80–100)
MONOCYTES # BLD AUTO: 0.99 K/MM3 (ref 0.16–1.47)
MONOCYTES # BLD: 0.5 K/MM3 (ref 0.16–1.47)
MONOCYTES NFR BLD AUTO: 6 % (ref 4–13)
MONOCYTES NFR BLD: 3 % (ref 4–13)
MYELOCYTES # BLD MANUAL: 0.33 K/MM3 (ref 0–0)
MYELOCYTES NFR BLD MANUAL: 2 % (ref 0–0)
NEUTROPHILS # BLD AUTO: 13.93 K/MM3 (ref 1.96–9.15)
NEUTROPHILS NFR BLD AUTO: 83 % (ref 41–73)
NEUTS BAND NFR BLD MANUAL: 3 % (ref 0–8)
NEUTS SEG # BLD MANUAL: 15.25 K/MM3 (ref 1.96–9.15)
NEUTS SEG NFR BLD MANUAL: 88 % (ref 41–73)
NRBC # BLD AUTO: 0.02 K/MM3 (ref 0–0.02)
NRBC BLD AUTO-RTO: 0.1 /100 WBC (ref 0–0.2)
PLATELET # BLD AUTO: 192 K/MM3 (ref 150–400)
POTASSIUM SERPL-SCNC: 4.1 MMOL/L (ref 3.5–5.5)
SODIUM SERPL-SCNC: 139 MMOL/L (ref 136–145)
TOTAL CELLS COUNTED BLD: 100
VANCOMYCIN TROUGH SERPL-MCNC: 8.1 UG/ML (ref 5–10)
VITAMIN B12: 1532 PG/ML (ref 232–1245)

## 2022-03-15 NOTE — NUR
Pt is a 76 year old man who came to the ED on 3/13 with new onset SOB. He
recently recovered from a Covid infection without complication.
 
His medical history includes prostate cancer with mets, COPD, HTN, dementia,
CHF, hernia repair.
 
He was diagnosed with bacterial pneumonia during this hospital stay. He was
quite confused yesterday, but has cleared significantly today. His wife is at
the bedside, and gave a summary of their medical issues and decisions made
this year. She states the patient began taking an alternative cancer treatment
last year, and has spent some time in [place] at their daughter's home. She
states the daughter has a room ready in her house in [place], OR if pt's
general condition worsens.  Wife Anjali also tells me that
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
years, so she is well versed in both HH and Hospice and their respective
roles. She also reports she and the patient lean on their "[employer] family" for
support. She states they are "nowhere near ready" to enroll in hospice care.
For now, they are considering home health as an option short term.
 
They appear to be capable, and have a solid and realistic plan in place for
the future.
 
Palliative care will remain available.

## 2022-03-15 NOTE — NUR
SHIFT SUMMARY
 
PT HAS BEEN RESTING IN ROOM. PT HAS BEEN UP IN ROOM WITH ASSISTANCE AND SPENT
A LARGE PORTION OF THE DAY IN THE BEDSIDE CHAIR. PT HAS BEEN ALERT AND
ORIENTED FOR THE DURATION. PT WAS ABLE TO SLEEP FOR APPROXIMATELY 2 HOURS
AROUND MIDDAY. PT HAS HAD MORE ENERGY AND REQUIRED LESS ASSISTANCE TO TRANSFER
BETWEEN BED AND CHAIR WHEN COMPARED WITH YESTERDAY. VSS, NO ACUTE CHANGES TO
CURRENT CONDITION.

## 2022-03-15 NOTE — NUR
0200 Respiratory distress, pt requesting Neb tx, RT paged, Neb given with
minimal effect, MD paged and ordered one time dose of 60mg IV solumedrol. VSS
pt has been on 2L NC all night with good sats, he states he is not getting
air. if these interventions not effective will CPAP him.
 
JEISON Correa

## 2022-03-15 NOTE — NUR
End of shift summary:
 
Pt did not sleep overnight, SOB, increased work of breathing, MD paged
multiple times throughout night. IV solu-medrol effective for ~3hrs. most of
the wheezing is upper airway/ tracheal.
0515 RT in room placing BiPaP to see if this facilitates more air movement,
other VSS, pain much better controlled, will monitor
 
Sunny Reyes RN

## 2022-03-16 LAB
BASOPHILS # BLD: 0 K/MM3 (ref 0–0.23)
BASOPHILS NFR BLD: 0 % (ref 0–2)
DEPRECATED RDW RBC AUTO: 58.7 FL (ref 35.1–46.3)
EOSINOPHIL # BLD: 0.23 K/MM3 (ref 0–0.68)
EOSINOPHIL NFR BLD: 1 % (ref 0–6)
ERYTHROCYTE [DISTWIDTH] IN BLOOD BY AUTOMATED COUNT: 18.2 % (ref 11.7–14.2)
HCT VFR BLD AUTO: 27.2 % (ref 37–53)
HGB BLD-MCNC: 8.4 G/DL (ref 13.5–17.5)
LYMPHOCYTES # BLD: 2.32 K/MM3 (ref 0.84–5.2)
LYMPHOCYTES NFR BLD: 10 % (ref 21–46)
MCHC RBC AUTO-ENTMCNC: 30.9 G/DL (ref 31.5–36.5)
MCV RBC AUTO: 89 FL (ref 80–100)
METAMYELOCYTES # BLD MANUAL: 0.46 K/MM3 (ref 0–0)
METAMYELOCYTES NFR BLD MANUAL: 2 % (ref 0–0)
MONOCYTES # BLD: 2.32 K/MM3 (ref 0.16–1.47)
MONOCYTES NFR BLD: 10 % (ref 4–13)
NEUTS BAND NFR BLD MANUAL: 4 % (ref 0–8)
NEUTS SEG # BLD MANUAL: 17.87 K/MM3 (ref 1.96–9.15)
NEUTS SEG NFR BLD MANUAL: 73 % (ref 41–73)
NRBC # BLD AUTO: 0.04 K/MM3 (ref 0–0.02)
NRBC BLD AUTO-RTO: 0.2 /100 WBC (ref 0–0.2)
PLATELET # BLD AUTO: 205 K/MM3 (ref 150–400)
TOTAL CELLS COUNTED BLD: 100

## 2022-03-16 PROCEDURE — 5A09457 ASSISTANCE WITH RESPIRATORY VENTILATION, 24-96 CONSECUTIVE HOURS, CONTINUOUS POSITIVE AIRWAY PRESSURE: ICD-10-PCS | Performed by: HOSPITALIST

## 2022-03-16 NOTE — NUR
DR. MEDLEY ON UNIT REVIEWED PATIENT CHART AND EKG.  STATES HE WILL PUT NEWW
ORDERS IN PLACED. CONTINOUS MONITORING ONGOING.

## 2022-03-16 NOTE — NUR
CALLED PLACED OUT TO DR. LANDRY CONCERNING PATIENT HR 's. AT 11PM DR. LANDRY STATED HE WILL BE TAKEN A LOOK INTO PATIENT INFORMATION CONCERNING
STARTING DIGOXIN ORDER. CONTINUE TO MONITOR PATIENT CONDITION.

## 2022-03-16 NOTE — NUR
NEW ORDERS GIVEN DIGOXIN AND CONTINUE CARDIZEM GTT AND TITRATE MEDICATION.
CONTINUE TO MONITOR PATIENT CONDITION.BY DR. MEDLEY

## 2022-03-16 NOTE — NUR
PHONE CALL TO DR. TARANGO REGARDING RHYTHYM CHANGE AND RATE INCREASE. CURRENTLY
A FIB WITH RATE BETWEEN 170-190. VERBAL ORDER GIVEN FOR 10MG IVP CARDIZEM.
MINIMAL RESUTLS, CALLED TO DR. TARANGO TO DISCUSS. DR. TARANGO, DR. ROSE, AND
DR. PAINTER TO ROOM. REPEAT EKG COMPLETE. VERBAL ORDER GIVEN FOR ADDITIONAL 10MG
IVP CARDIZEM AND DRIP ORDERED. DRIP STARTED AT 10ML/HR. BLOOD PRESSURE STABLE,
WILL CONTINUE TO MONITOR.

## 2022-03-16 NOTE — NUR
PHONE CALL TO DR. PAINTER. CARDIZEM AT 20ML/HR. REMAINS IN AFIB WITH RATE
150-170. BLOOD PRESSURE STABLE, O2 SATS 93% ON 2L. STATES FEELS CHEST
TIGHTNESS, AUDIABLE WHEEZE. HOLD BREATHING TX DUE TO HEART RATE PER DR. PAINTER.
DR. PAINTER WILL REASSESS PT SHORTLY.

## 2022-03-16 NOTE — NUR
SHIFT SUMMARY;
 
ASSUMED CARE AT 0700. A/A/OX3 WITH INTERMITANT CONFUSION. PERLA CATH INPLACE
DRAINING YELLOW URINE. 2L 02 VIA NC WITH INTERMITANT CPAP DURING SHIFT. SEE
PREVIOUS NOTES REGARDING HEART RATE. CARDIZEM CURRENTLY AT 10ML/HR, BLOOD
PRESSURE 02 SAT STABLE, -130. NO CHEST PAIN OR SOB AT THIS TIME. FAMILY
AT BEDSIDE MOST OF SHIFT. REPOSITIONS SELF IN BED AS NEEDED. CODE STATUS
CHANGED TO DNR TODAY AFTER SPOUSE AND PT MEETING WITH DR. TARANGO AND
RESIDENTS. L/S WHEEZY IN UPPER AND CLEAR IN BASES. WILL CONTINUE TO MONITOR
AND TREAT UNTIL CHANGE OF SHIFT.

## 2022-03-16 NOTE — NUR
CALLED PLACED OUT TO DR. LANDRY CONCERNING PATIENT CHABGE IN STATUS 
INCONSISTENCY. CARDIZEM DRIP AT MAX. ORDERS TO MONITOR HR -130's FOR
5-10 MINS. CALL BACK IF NO IMPROVEMNET. PATIENT IS ASYMPTOMATIC. SAFETY
MAINTAINED.

## 2022-03-16 NOTE — NUR
AT 0500 patient noted with some confusion 's.Requesting for a police
officer. patient redirected, patient is aware of his location and name at this
time. patient noted with shortness of breath. RT called and treatment given.
patient hr elevated breathing treatment stopped. patient assessed, and made
aware that RT has to stop treatment due to his HR and he needs to calm down
then treatment can continue. patient agreed and light off safety maintained.
HR drop down into the 110. Reeves reinserted 16 FR. Continue to monitor
patient.

## 2022-03-16 NOTE — NUR
AT 1230 PATIENT PULL OUT PERLA, SITE ASSESSED. PATIENT WAS NOTED ON THIS
ASSESSMENT PATIENT WAS NOTED WITH FORGETFULNESS AND PERIODS OF CONFUSION.
PATIENT STATES "I TOLD MY WIFE I DONT WANT THAT PERLA." PATIENT WAS REDIRECTED
THAT HE IS INDEED IN THE HOSPITAL AND THE RECENT WHY HE NEEDS A PERLA IN
PLACED. PATIENT STATES HE CAN USE THE URINAL ONE WAS PROVIDED FOR HIM TO USED.
CONTINUE TO MONITOR PATIENT CONDITION. CALL LIGHT WITHIN REACH.

## 2022-03-16 NOTE — NUR
PATIENT REMAINS SITTING AT THE SIDE OF BED, ALERT AND ORIENTED. PATIENT DENIES
ANY PAIN AT THIS TIME OF ASSESSMENT. PATIENT HAS OXYGEN IN USE AT 2 LITERS.
LUNGS SOUNDS ASSESSED WHEEZING NOTED TO RIGHT AND LEFT UPPER LOBES, AND CLEAR
AT THE BASE. BOWEL SOUNDS PATENT. LAST BM 3/15. SKIN INTACT. IV TO LEFT AC NOT
INPLACED AND DISCONTINUED. CALL LIGHT WITHIN REACH CONTINUE TO MONITOR PATIENT
CONDITION.

## 2022-03-16 NOTE — NUR
Spiritual care visit provided. Pt's sp. saw me in the hallway and asked me to
come into pt's rm and pray for him because he is having a rough morning as he
is battling both heart and lung issues. I gladly provide prayer. Patient and
pt's sp. respond well and show signs of increased peace. I will continue to
remain available to patient and family.

## 2022-03-17 LAB
BASOPHILS # BLD: 0 K/MM3 (ref 0–0.23)
BASOPHILS NFR BLD: 0 % (ref 0–2)
DEPRECATED RDW RBC AUTO: 60.1 FL (ref 35.1–46.3)
EOSINOPHIL # BLD: 0.15 K/MM3 (ref 0–0.68)
EOSINOPHIL NFR BLD: 1 % (ref 0–6)
ERYTHROCYTE [DISTWIDTH] IN BLOOD BY AUTOMATED COUNT: 18.3 % (ref 11.7–14.2)
HCT VFR BLD AUTO: 25.5 % (ref 37–53)
HGB BLD-MCNC: 7.8 G/DL (ref 13.5–17.5)
LYMPHOCYTES # BLD: 1.36 K/MM3 (ref 0.84–5.2)
LYMPHOCYTES NFR BLD: 9 % (ref 21–46)
MCHC RBC AUTO-ENTMCNC: 30.6 G/DL (ref 31.5–36.5)
MCV RBC AUTO: 90 FL (ref 80–100)
MONOCYTES # BLD: 0.9 K/MM3 (ref 0.16–1.47)
MONOCYTES NFR BLD: 6 % (ref 4–13)
MYELOCYTES # BLD MANUAL: 0.15 K/MM3 (ref 0–0)
MYELOCYTES NFR BLD MANUAL: 1 % (ref 0–0)
NEUTS BAND NFR BLD MANUAL: 5 % (ref 0–8)
NEUTS SEG # BLD MANUAL: 12.55 K/MM3 (ref 1.96–9.15)
NEUTS SEG NFR BLD MANUAL: 78 % (ref 41–73)
NRBC # BLD AUTO: 0.04 K/MM3 (ref 0–0.02)
NRBC BLD AUTO-RTO: 0.3 /100 WBC (ref 0–0.2)
PLATELET # BLD AUTO: 149 K/MM3 (ref 150–400)
TOTAL CELLS COUNTED BLD: 100

## 2022-03-17 NOTE — NUR
CARE ASSUMTION
 
UPON CARE ASSUMPTION, BED ALARM WENT OFF. RT IN ROOM WITH PATIENT. RN THEN TO
ROOM TO TURN OFF ALARM. PT ADAMENT ON GOING HOME. PT STATES, "I SIGNED THE
HOSPICE CONTRACT. MY  SIGNED IT. THE  FROM [facility] SIGNED IT.
I AM GOING HOME."  PT RAISED VOICE TO RN AND TO CHARGE RN, PT STATED HE WOULD
NOT GET BACK IN BED. PT REMOVED NASAL CANNULA. SECURITY CALLED. SECURITY TO
ROOM AND DISTRACTED PT BY TALKING ABOUT PICTURES ON THE PT'S BEDSIDE TABLE. PT
AGREED TO LAY ON BED AND TALK TO SECURITY ABOUT THE PICTURES. PT'S FAMILY
CALLED. PT'S WIFE AND DAUGHTER TO ROOM TO CALM PATIENT, ASKED TO BE ALONE.
PT'S FAMILY CALLED RN BACK TO ROOM, PT STATED HE WISHES TO STAY TONIGHT.
FAMILY AGREED TO HAVE ONE FAMILY MEMBER STAY WITH PATIENT TO HELP HIM REMAIN
CALM.

## 2022-03-17 NOTE — NUR
SHIFT SUMMARY;
 
ASSUMED CARE AT 0700. A/A/0X3 WITH INTERMITANT CONFUSION. CARDIZEM STOPPED
TODAY, REMAINS IN AFIB WITH RATE 70-90. DIGOXIN STARTED AND METOPROLOL
INCREASED TODAY. PERLA IN PLACE DRAINING CLEAR YELLOW URINE. UP TO BEDSIDE
COMMODE WITH ASSISTANCE TODAY, REPOSITIONS SELF IN BED AS NEEDED. PLEASANT AND
COOPERATIVE WITH CARE. VSS, WILL CONTINUE TO MONITOR AND TREAT UNTIL CHANGE OF
SHIFT.

## 2022-03-17 NOTE — NUR
PATIENT REMAINS IN BED ALERT AND ORIENTED TIMES 4 CAN MAKE ALL NEEDS KNOWN.
PATIENT ASSESSED AND DENIES PAIN AT THIS TIME. LUNGS SOUNDS NOTED WITH
WHEEZING AT THE RIGHT AND LEFT UPPER LOBES,CLEAR AT THE BASE.CARDIZEM GTT IN
PLACE,CONTINUOUS MONITORING IN PLACED. CALL LIGHT WITHIN REACH TURNED AND
REPOSITION AS NEEDED.

## 2022-03-17 NOTE — NUR
Received call from Primary RN Ilana reporting family would like information on
advanced directives.
 
Arrived to Pt's room. Pt resting in bed with his eyes closed. Pt left
undisturbed at this time. No S/S of distress noted. Family at bedside.
Provided advanced directive and educated on each section to complete.
Discussed the importance of having a healthcare representative. Offered
therapeutic listening and answered questions. Family expresses appreciation
and report no other concerns at this time.
 
Palliative Care will remain available.

## 2022-03-18 LAB
BASOPHILS # BLD: 0 K/MM3 (ref 0–0.23)
BASOPHILS NFR BLD: 0 % (ref 0–2)
DEPRECATED RDW RBC AUTO: 58.8 FL (ref 35.1–46.3)
EOSINOPHIL # BLD: 0.14 K/MM3 (ref 0–0.68)
EOSINOPHIL NFR BLD: 1 % (ref 0–6)
ERYTHROCYTE [DISTWIDTH] IN BLOOD BY AUTOMATED COUNT: 18.4 % (ref 11.7–14.2)
HCT VFR BLD AUTO: 25 % (ref 37–53)
HGB BLD-MCNC: 7.8 G/DL (ref 13.5–17.5)
LYMPHOCYTES # BLD: 0.59 K/MM3 (ref 0.84–5.2)
LYMPHOCYTES NFR BLD: 4 % (ref 21–46)
MCHC RBC AUTO-ENTMCNC: 31.2 G/DL (ref 31.5–36.5)
MCV RBC AUTO: 88 FL (ref 80–100)
METAMYELOCYTES # BLD MANUAL: 0.14 K/MM3 (ref 0–0)
METAMYELOCYTES NFR BLD MANUAL: 1 % (ref 0–0)
MONOCYTES # BLD: 1.92 K/MM3 (ref 0.16–1.47)
MONOCYTES NFR BLD: 13 % (ref 4–13)
MYELOCYTES # BLD MANUAL: 0.44 K/MM3 (ref 0–0)
MYELOCYTES NFR BLD MANUAL: 3 % (ref 0–0)
NEUTS BAND NFR BLD MANUAL: 2 % (ref 0–8)
NEUTS SEG # BLD MANUAL: 11.52 K/MM3 (ref 1.96–9.15)
NEUTS SEG NFR BLD MANUAL: 76 % (ref 41–73)
NRBC # BLD AUTO: 0.04 K/MM3 (ref 0–0.02)
NRBC BLD AUTO-RTO: 0.3 /100 WBC (ref 0–0.2)
PLATELET # BLD AUTO: 148 K/MM3 (ref 150–400)
TOTAL CELLS COUNTED BLD: 100

## 2022-03-18 NOTE — NUR
CARE ASSUMPTION: RECEIVED REPORT FROM ENRIQUE [facility] RN. INTRODUCED SELF TO
PATIENT AND FAMILY IN ROOM PATIENT. TURNED ON BILATERAL SCDS. PATIENT'S FAMILY
STATED SHOWING SOME SIGNS OF CONFUSION. THIS RN ASSURED PATIENT SHE WOULD
LEAVE THE BLINDS OPEN FOR HIM.  NOTED -130S ON MONITOR.

## 2022-03-18 NOTE — NUR
SHIFT SUMMARY;
 
ASSUMED CARE AT 0700, SLEEPING BUT AROUSABLE. RESTRAINTS DC'D SHORTLY AFTER
SHIFT CHANGE WHEN FAMILY ARRIVES. PHONE CALL TO DR. MARTEL REGARDING INCREASING
HEART RATE AT 0730. ADDITIONAL MEDS ORDERS PLACED. MEDICATED PER EMAR. HR AFIB
 DURING SHIFT. INCREASE IN HEART RATE WITH EXERTION. WORKED WITH PT/OT,
UP TO BEDSIDE COMMODE DURING SHIFT. PALLATIVE CARE MEETING WITH FAMILY WHO HAS
DECIDED ON HOSPICE. DR. TARANGO UPDATED. A/A/OX3 TODAY, PLEASANT AND
COOPERATIVE WITH CARE. WILL CONTINUE TO MONITOR AND TREAT UNTIL CHANGE OF
SHIFT.

## 2022-03-18 NOTE — NUR
SHIFT SUMMARY
 
PTS FAMILY DID NOT WISH TO STAY DURING NIGHT. LEFT SHORTLY AFTER ARRIVED,
AFTER CALMING PATIENT DOWN. PT REMAINED ALERT, ORIENTED TO SELF, FAMILY,
PLACE. SP02>92% ON 2L NC. TELEMETRY SHOWS AFIB, 90'S-140'S. PERLA CAHTETER
DRAINING CLEAR YELLOW URINE TO GRAVITY. NO BM THIS SHIFT. PT C/O OF
GENERALIZED PAIN, MEDICATED PER EMAR X1. PT AGITATED THIS SHIFT. CONTINUED TO
WANT TO GO HOME. ATTEMPTED TO PULL OUT POWERGLIDE. PULLED DRESSING AND
CONNECTOR OFF, CATHETER REMAINED IN PLACE. REPLACED CONNECTORS AND REDRESSED.
ATTEMPTED MULTIPLE TIMES TO PULL OUT PERLA. GOT A BUSY APRON TO HELP DISTRACT.
CONTINUED TO PULL AT LINES. CALL PLACED TO MD DODSON. MD DODSON W/
ORDERS FOR SOFT WRIST RESTRAINTS. PT NOT HAPPY ABOUT RETRAINTS, YELLING OUT
"CUT THESE OFF" "I NEED TO TAKE MY PERLA OUT" "I AM GOING HOME". pT ALSO
STATES, "THE PRESIDENT GAVE ME ORDERS TO ARREST AJ, HIS FAMILY, AND HIS
KIDS." PT DID NOT SLEEPING DURING NIGHT. STAFF IN ROOM MUCH OF THE NIGHT.
CALL LIGHT IN REACH.

## 2022-03-19 LAB
ANION GAP SERPL CALCULATED.4IONS-SCNC: 6 MMOL/L (ref 6–16)
BASOPHILS # BLD: 0.16 K/MM3 (ref 0–0.23)
BASOPHILS NFR BLD: 1 % (ref 0–2)
BUN SERPL-MCNC: 25 MG/DL (ref 8–24)
CALCIUM SERPL-MCNC: 10.1 MG/DL (ref 8.5–10.1)
CHLORIDE SERPL-SCNC: 98 MMOL/L (ref 98–108)
CO2 SERPL-SCNC: 32 MMOL/L (ref 21–32)
CREAT SERPL-MCNC: 0.76 MG/DL (ref 0.6–1.2)
DEPRECATED RDW RBC AUTO: 61 FL (ref 35.1–46.3)
EOSINOPHIL # BLD: 0.49 K/MM3 (ref 0–0.68)
EOSINOPHIL NFR BLD: 3 % (ref 0–6)
ERYTHROCYTE [DISTWIDTH] IN BLOOD BY AUTOMATED COUNT: 18.7 % (ref 11.7–14.2)
GLUCOSE SERPL-MCNC: 145 MG/DL (ref 70–99)
HCT VFR BLD AUTO: 26.6 % (ref 37–53)
HGB BLD-MCNC: 8.2 G/DL (ref 13.5–17.5)
LYMPHOCYTES # BLD: 1.81 K/MM3 (ref 0.84–5.2)
LYMPHOCYTES NFR BLD: 11 % (ref 21–46)
MCHC RBC AUTO-ENTMCNC: 30.8 G/DL (ref 31.5–36.5)
MCV RBC AUTO: 89 FL (ref 80–100)
METAMYELOCYTES # BLD MANUAL: 0.16 K/MM3 (ref 0–0)
METAMYELOCYTES NFR BLD MANUAL: 1 % (ref 0–0)
MONOCYTES # BLD: 1.32 K/MM3 (ref 0.16–1.47)
MONOCYTES NFR BLD: 8 % (ref 4–13)
MYELOCYTES # BLD MANUAL: 0.66 K/MM3 (ref 0–0)
MYELOCYTES NFR BLD MANUAL: 4 % (ref 0–0)
NEUTS BAND NFR BLD MANUAL: 1 % (ref 0–8)
NEUTS SEG # BLD MANUAL: 11.9 K/MM3 (ref 1.96–9.15)
NEUTS SEG NFR BLD MANUAL: 71 % (ref 41–73)
NRBC # BLD AUTO: 0.05 K/MM3 (ref 0–0.02)
NRBC BLD AUTO-RTO: 0.3 /100 WBC (ref 0–0.2)
PLATELET # BLD AUTO: 156 K/MM3 (ref 150–400)
POTASSIUM SERPL-SCNC: 3.9 MMOL/L (ref 3.5–5.5)
SODIUM SERPL-SCNC: 136 MMOL/L (ref 136–145)
TOTAL CELLS COUNTED BLD: 100

## 2022-03-19 NOTE — NUR
CARE ASSUMPTION: RECEIVED REPORT FROM JEISON HERNANDEZ. PATIENT IS MED TELE STATUS
WITH PLANS TO D/C WITH HOSPICE ON MONDAY. TWIN BROTHER IS AT BEDSIDE AND
PATIENT IS IN CHAIR AND IN GOOD SPIRITS. PATIENT A & O X1. POWERGLIDE FLUSHES
AND DRAWS. NO CHANGES TO VS AND BOUTS OF TACHYCARDIA.

## 2022-03-19 NOTE — NUR
SHIFT SUMMARY: PATIENT DENIES CHEST PAIN AND SOB. HR 80-170S AFIB W/PVCS AND
SOME RUNS OF ASYMPTOMATIC SVT. PLEASANT AND COOPERATIVE WITH CARE. FAMILY AT
BEDSIDE UNTIL 0200 AND HELPFUL WITH CARE. THEY WILL RETURN THIS AM. PLAN IS TO
D/C ON HOSPICE TODAY. PATIENT LIKES TO SIT ON EDGE OF BED AND LOOK AT/TOUCH
ITEMS ON TABLE. WILL CONTINUE TO MONITOR AND REPORT TO ONCOMING RN.

## 2022-03-19 NOTE — NUR
SHIFT SUMMARY
PT REMAINS ALERT AND ORIENTED THIS SHIFT. PT RESTED ON AND OFF THIS SHIFT. BP
STABLE. HR HAS BEEN AFIB 90'S THIS AFTERNOON. O2 SATS REMAIN ABOVE 90% ON 2L
NC. PT HAS BEEN UP TO RECLINER FOR MEALS. PERLA PATENT AND DRAINING CLEAR
YELLOW URINE. PT COMPLAINED OF PAIN "ALL OVER" EARLIER AND MEDICATED PER EMAR.
FAMILY AT BEDSIDE ALL OF SHIFT. WILL CONTINUE TO MONITOR AND REPORT TO
ONCOMING RN

## 2022-03-20 LAB
BASOPHILS # BLD: 0.29 K/MM3 (ref 0–0.23)
BASOPHILS NFR BLD: 2 % (ref 0–2)
DEPRECATED RDW RBC AUTO: 60.2 FL (ref 35.1–46.3)
EOSINOPHIL # BLD: 0.14 K/MM3 (ref 0–0.68)
EOSINOPHIL NFR BLD: 1 % (ref 0–6)
ERYTHROCYTE [DISTWIDTH] IN BLOOD BY AUTOMATED COUNT: 18.6 % (ref 11.7–14.2)
HCT VFR BLD AUTO: 24.8 % (ref 37–53)
HGB BLD-MCNC: 7.7 G/DL (ref 13.5–17.5)
LYMPHOCYTES # BLD: 0.89 K/MM3 (ref 0.84–5.2)
LYMPHOCYTES NFR BLD: 6 % (ref 21–46)
MCHC RBC AUTO-ENTMCNC: 31 G/DL (ref 31.5–36.5)
MCV RBC AUTO: 90 FL (ref 80–100)
METAMYELOCYTES # BLD MANUAL: 0.14 K/MM3 (ref 0–0)
METAMYELOCYTES NFR BLD MANUAL: 1 % (ref 0–0)
MONOCYTES # BLD: 0.74 K/MM3 (ref 0.16–1.47)
MONOCYTES NFR BLD: 5 % (ref 4–13)
MYELOCYTES # BLD MANUAL: 0.44 K/MM3 (ref 0–0)
MYELOCYTES NFR BLD MANUAL: 3 % (ref 0–0)
NEUTS BAND NFR BLD MANUAL: 2 % (ref 0–8)
NEUTS SEG # BLD MANUAL: 12.21 K/MM3 (ref 1.96–9.15)
NEUTS SEG NFR BLD MANUAL: 80 % (ref 41–73)
NRBC # BLD AUTO: 0.03 K/MM3 (ref 0–0.02)
NRBC BLD AUTO-RTO: 0.2 /100 WBC (ref 0–0.2)
PLATELET # BLD AUTO: 149 K/MM3 (ref 150–400)
TOTAL CELLS COUNTED BLD: 100

## 2022-03-20 NOTE — NUR
SHIFT SUMMARY
PT REMAINS ALERT AND ORIENTED. MILD CONFUSION AT TIMES, BUT PT REORIENTS
EASILY. VS STABLE. O2 SATS REMAIN ABOVE 90% ON 1L NC. BP STABLE. HR AFIB 90'S.
PT COMPLAINS OF PAIN AT TIMES AND MEDICATED PER EMAR. PERLA PATENT AND
DRAINING. PT UP OUT OF BED MULTIPLE TIMES THROUGHOUT SHIFT WITH 1 ASSIST, GB
AND FWW. FAMILY AT BEDSIDE ALL OF SHIFT. WILL CONTINUE TO MONITOR AND REPORT
TO ONCOMING RN

## 2022-03-20 NOTE — NUR
SHIFT SUMMARY: PATIENT STAYED IN RECLINER T/O NIGHT. HR WERE 80-110S AFTER
2100 MEDS WERE ADMINISTERED. PATIENT WORE CPAP FOR 2-3 HRS THEN WAS AWAKE THE
REST OF THE NIGHT. REPORT GIVEN TO JEISNO HERNANDEZ.

## 2022-03-20 NOTE — NUR
CARE ASSUMPTION: RECEIVED REPORT FROM JEISON HERNANDEZ. PATIENT IN BED WITH TWIN
BROTHER AT BEDSIDE. O2 SAT 92%+ ON 1L NC, HR 80-120S. PATIENT STATES HE IS
COLD, BUT HIS SKIN, HANDS, AND FEET ARE WARM. PATIENT IS AFEBRILE. PATIENT
STATES HE IS IN "[facility]" AND WHEN ASKED WHAT CITY HE IS IN SAYS
"[place]." ANSWERS HIS  FOR ANY DATE RELATED QUESTIONS AND STATES IT IS
THE MONTH OF MAY.

## 2022-03-21 LAB
BASOPHILS # BLD: 0 K/MM3 (ref 0–0.23)
BASOPHILS NFR BLD: 0 % (ref 0–2)
DEPRECATED RDW RBC AUTO: 60.3 FL (ref 35.1–46.3)
EOSINOPHIL # BLD: 0.12 K/MM3 (ref 0–0.68)
EOSINOPHIL NFR BLD: 1 % (ref 0–6)
ERYTHROCYTE [DISTWIDTH] IN BLOOD BY AUTOMATED COUNT: 18.8 % (ref 11.7–14.2)
HCT VFR BLD AUTO: 23.2 % (ref 37–53)
HGB BLD-MCNC: 7.2 G/DL (ref 13.5–17.5)
LYMPHOCYTES # BLD: 0.6 K/MM3 (ref 0.84–5.2)
LYMPHOCYTES NFR BLD: 5 % (ref 21–46)
MCHC RBC AUTO-ENTMCNC: 31 G/DL (ref 31.5–36.5)
MCV RBC AUTO: 89 FL (ref 80–100)
METAMYELOCYTES # BLD MANUAL: 0.12 K/MM3 (ref 0–0)
METAMYELOCYTES NFR BLD MANUAL: 1 % (ref 0–0)
MONOCYTES # BLD: 0.72 K/MM3 (ref 0.16–1.47)
MONOCYTES NFR BLD: 6 % (ref 4–13)
MYELOCYTES # BLD MANUAL: 0.12 K/MM3 (ref 0–0)
MYELOCYTES NFR BLD MANUAL: 1 % (ref 0–0)
NEUTS BAND NFR BLD MANUAL: 4 % (ref 0–8)
NEUTS SEG # BLD MANUAL: 10.43 K/MM3 (ref 1.96–9.15)
NEUTS SEG NFR BLD MANUAL: 82 % (ref 41–73)
NRBC # BLD AUTO: 0.03 K/MM3 (ref 0–0.02)
NRBC BLD AUTO-RTO: 0.2 /100 WBC (ref 0–0.2)
PLATELET # BLD AUTO: 151 K/MM3 (ref 150–400)
TOTAL CELLS COUNTED BLD: 100

## 2022-03-21 NOTE — NUR
NEW ORDER: HR SUSTAINING 130-170S AFTER MIDNIGHT. HOSPITALIST ORDERED 1X 5MG
LOPRESSOR IV NOW AND 5 MG LOPRESSOR IV AN HOUR LATER. EKG ALSO ORDERED.
FIRST DOSE GIVEN SHORTLY AFTER 0130. AT THIS TIME SECOND LOPRESSOR DOSE HAS
NOT BEEN ADMINISTERED AS HR <100 FOLLOWING FIRST DOSE.

## 2022-03-21 NOTE — NUR
SHIFT SUMMARY
ORDERS FOR DISCHARGE HOME WITH HOSPICE. DISCHARGE INSTRUCTIONS AND MEDICATIONS
PROVIDED TO PT AND WIFE. ALL QUESTIONS ANSWERED. THIS RN CONTACTED [facility]
AND OXYGEN TO BE DELIVERED AND NURSES TO MEET PT AT THE HOUSE. AWAITING O2 TO
BE DELIVERED AND THEN PT WILL BE TAKEN OUT BY WC

## 2022-03-21 NOTE — NUR
SHIFT SUMMARY: PATIENT TACHYCARDIC FOLLOWING 2100 MED ADMINISTRATION. HR
70-90S AFTER METOPROLOL PUSH; SECOND DOSE WAS NOT ADMINISTERED. CPAP PLACED ON
PATIENT AROUND 2[address] AT TIME OF THIS NOTE. PATIENT HAD
MORE CONFUSION THIS EVENING THAN LAST TWO NIGHTS BUT WAS NOT COMBATIVE OR
PULLING AT LINES. EKG WAS COMPLETED FOLLOWING PATIENT COMPLAINT OF CHEST PAIN.
TEMPERATIVE AND BP WNL, THOUGH PATIENT COMPLAINED OF BOTH BEING TOO COLD
AND LATER BEING TOO HOT. WARMED BLANKETS WERE PROVIDED FOR "TOO COLD" AND COOL
CLOTHS FOR "TOO HOT." BILATERAL PAS DEVICES IN PLACE. TWIN BROTHER AT
BEDSIDE T/O NIGHT. DAUGHTER ADELINA HAS RETURNED TO [place] AND STATED PATIENT'S
WIFE IS NOW PRIMARY FOR CONTACT. PLAN IS FOR PATIENT TO D/C TODAY. WILL
CONTINUE TO MONITOR AND REPORT TO ONCOMING RN.

## 2022-08-12 NOTE — NUR
SHIFT SUMMARY
PT A/O X4; PLEASANT AND HARD OF HEARING. HE IS IND/SBA IN THE ROOM. PERLA IN
PLACE, PATENT AND DRAINING. THE PT IS CURRENTLY ON 3 LITERS O2 VIA NC. HE
USES A CPAP AT NIGHT. C/O OF PAIN IN HIS R HIP AND HIS LOWER BACK DUE TO
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 TREATED PER EMR FOR PAIN. WIFE AT THE
BEDSIDE. WIFE SPOKE WITH THE DOCTOR REGARDING QUESTIONS ABOUT THE PATIENT.
VSS; CONT. BIOX ON; CALL LIGHT IN REACH. 16